# Patient Record
Sex: MALE | Race: AMERICAN INDIAN OR ALASKA NATIVE | NOT HISPANIC OR LATINO | URBAN - METROPOLITAN AREA
[De-identification: names, ages, dates, MRNs, and addresses within clinical notes are randomized per-mention and may not be internally consistent; named-entity substitution may affect disease eponyms.]

---

## 2017-07-17 ENCOUNTER — OUTPATIENT (OUTPATIENT)
Dept: OUTPATIENT SERVICES | Facility: HOSPITAL | Age: 62
LOS: 1 days | End: 2017-07-17
Payer: COMMERCIAL

## 2017-07-17 ENCOUNTER — APPOINTMENT (OUTPATIENT)
Dept: MRI IMAGING | Facility: IMAGING CENTER | Age: 62
End: 2017-07-17
Payer: COMMERCIAL

## 2017-07-17 DIAGNOSIS — Z98.89 OTHER SPECIFIED POSTPROCEDURAL STATES: Chronic | ICD-10-CM

## 2017-07-17 DIAGNOSIS — Z00.8 ENCOUNTER FOR OTHER GENERAL EXAMINATION: ICD-10-CM

## 2017-07-17 PROCEDURE — 74183 MRI ABD W/O CNTR FLWD CNTR: CPT | Mod: 26

## 2017-07-17 PROCEDURE — A9585: CPT

## 2017-07-17 PROCEDURE — 74183 MRI ABD W/O CNTR FLWD CNTR: CPT

## 2017-07-17 PROCEDURE — 82565 ASSAY OF CREATININE: CPT

## 2018-03-08 ENCOUNTER — APPOINTMENT (OUTPATIENT)
Dept: UROLOGY | Facility: CLINIC | Age: 63
End: 2018-03-08
Payer: COMMERCIAL

## 2018-03-08 LAB
APPEARANCE: CLEAR
BACTERIA: NEGATIVE
BILIRUBIN URINE: NEGATIVE
BLOOD URINE: NEGATIVE
COLOR: YELLOW
GLUCOSE QUALITATIVE U: NEGATIVE MG/DL
HYALINE CASTS: 1 /LPF
KETONES URINE: NEGATIVE
LEUKOCYTE ESTERASE URINE: NEGATIVE
MICROSCOPIC-UA: NORMAL
NITRITE URINE: NEGATIVE
PH URINE: 5.5
PROTEIN URINE: NEGATIVE MG/DL
RED BLOOD CELLS URINE: 1 /HPF
SPECIFIC GRAVITY URINE: 1.02
SQUAMOUS EPITHELIAL CELLS: 0 /HPF
UROBILINOGEN URINE: NEGATIVE MG/DL
WHITE BLOOD CELLS URINE: 1 /HPF

## 2018-03-08 PROCEDURE — 99204 OFFICE O/P NEW MOD 45 MIN: CPT

## 2018-03-09 LAB
PSA FREE FLD-MCNC: NORMAL
PSA FREE SERPL-MCNC: <0.01 NG/ML
PSA SERPL-MCNC: 0.02 NG/ML

## 2018-03-10 LAB — CORE LAB FLUID CYTOLOGY: NORMAL

## 2018-04-03 ENCOUNTER — RX RENEWAL (OUTPATIENT)
Age: 63
End: 2018-04-03

## 2018-05-03 ENCOUNTER — APPOINTMENT (OUTPATIENT)
Dept: DERMATOLOGY | Facility: CLINIC | Age: 63
End: 2018-05-03
Payer: COMMERCIAL

## 2018-05-03 VITALS
DIASTOLIC BLOOD PRESSURE: 70 MMHG | WEIGHT: 152 LBS | HEIGHT: 63 IN | SYSTOLIC BLOOD PRESSURE: 120 MMHG | BODY MASS INDEX: 26.93 KG/M2

## 2018-05-03 DIAGNOSIS — Z91.89 OTHER SPECIFIED PERSONAL RISK FACTORS, NOT ELSEWHERE CLASSIFIED: ICD-10-CM

## 2018-05-03 PROCEDURE — 99203 OFFICE O/P NEW LOW 30 MIN: CPT

## 2018-06-27 ENCOUNTER — OTHER (OUTPATIENT)
Age: 63
End: 2018-06-27

## 2018-07-02 ENCOUNTER — OTHER (OUTPATIENT)
Age: 63
End: 2018-07-02

## 2018-07-09 ENCOUNTER — APPOINTMENT (OUTPATIENT)
Dept: MRI IMAGING | Facility: IMAGING CENTER | Age: 63
End: 2018-07-09
Payer: COMMERCIAL

## 2018-07-09 ENCOUNTER — OUTPATIENT (OUTPATIENT)
Dept: OUTPATIENT SERVICES | Facility: HOSPITAL | Age: 63
LOS: 1 days | End: 2018-07-09
Payer: COMMERCIAL

## 2018-07-09 DIAGNOSIS — D30.00 BENIGN NEOPLASM OF UNSPECIFIED KIDNEY: ICD-10-CM

## 2018-07-09 DIAGNOSIS — Z98.89 OTHER SPECIFIED POSTPROCEDURAL STATES: Chronic | ICD-10-CM

## 2018-07-09 PROCEDURE — 82565 ASSAY OF CREATININE: CPT

## 2018-07-09 PROCEDURE — 74183 MRI ABD W/O CNTR FLWD CNTR: CPT

## 2018-07-09 PROCEDURE — 74183 MRI ABD W/O CNTR FLWD CNTR: CPT | Mod: 26

## 2018-07-09 PROCEDURE — A9585: CPT

## 2018-09-21 ENCOUNTER — RX RENEWAL (OUTPATIENT)
Age: 63
End: 2018-09-21

## 2019-01-30 ENCOUNTER — APPOINTMENT (OUTPATIENT)
Dept: UROLOGY | Facility: CLINIC | Age: 64
End: 2019-01-30
Payer: COMMERCIAL

## 2019-01-30 LAB
APPEARANCE: CLEAR
BACTERIA: NEGATIVE
BILIRUBIN URINE: NEGATIVE
BLOOD URINE: NEGATIVE
COLOR: YELLOW
GLUCOSE QUALITATIVE U: NEGATIVE MG/DL
KETONES URINE: NEGATIVE
LEUKOCYTE ESTERASE URINE: NEGATIVE
MICROSCOPIC-UA: NORMAL
NITRITE URINE: NEGATIVE
PH URINE: 6
PROTEIN URINE: NEGATIVE MG/DL
RED BLOOD CELLS URINE: 0 /HPF
SPECIFIC GRAVITY URINE: 1.01
SQUAMOUS EPITHELIAL CELLS: 0 /HPF
UROBILINOGEN URINE: NEGATIVE MG/DL
WHITE BLOOD CELLS URINE: 0 /HPF

## 2019-01-30 PROCEDURE — 99214 OFFICE O/P EST MOD 30 MIN: CPT

## 2019-01-31 LAB
PSA FREE FLD-MCNC: NORMAL
PSA FREE SERPL-MCNC: <0.01 NG/ML
PSA SERPL-MCNC: 0.01 NG/ML

## 2019-07-05 ENCOUNTER — RX RENEWAL (OUTPATIENT)
Age: 64
End: 2019-07-05

## 2019-07-15 ENCOUNTER — OTHER (OUTPATIENT)
Age: 64
End: 2019-07-15

## 2019-07-17 ENCOUNTER — OTHER (OUTPATIENT)
Age: 64
End: 2019-07-17

## 2019-07-22 ENCOUNTER — FORM ENCOUNTER (OUTPATIENT)
Age: 64
End: 2019-07-22

## 2019-07-23 ENCOUNTER — APPOINTMENT (OUTPATIENT)
Dept: MRI IMAGING | Facility: IMAGING CENTER | Age: 64
End: 2019-07-23
Payer: COMMERCIAL

## 2019-07-23 ENCOUNTER — OUTPATIENT (OUTPATIENT)
Dept: OUTPATIENT SERVICES | Facility: HOSPITAL | Age: 64
LOS: 1 days | End: 2019-07-23
Payer: COMMERCIAL

## 2019-07-23 DIAGNOSIS — Z98.89 OTHER SPECIFIED POSTPROCEDURAL STATES: Chronic | ICD-10-CM

## 2019-07-23 DIAGNOSIS — N40.1 BENIGN PROSTATIC HYPERPLASIA WITH LOWER URINARY TRACT SYMPTOMS: ICD-10-CM

## 2019-07-23 PROCEDURE — 74183 MRI ABD W/O CNTR FLWD CNTR: CPT

## 2019-07-23 PROCEDURE — A9585: CPT

## 2019-07-23 PROCEDURE — 74183 MRI ABD W/O CNTR FLWD CNTR: CPT | Mod: 26

## 2019-11-25 ENCOUNTER — EMERGENCY (EMERGENCY)
Facility: HOSPITAL | Age: 64
LOS: 1 days | Discharge: HOME | End: 2019-11-25
Attending: EMERGENCY MEDICINE
Payer: COMMERCIAL

## 2019-11-25 VITALS
HEART RATE: 72 BPM | RESPIRATION RATE: 16 BRPM | OXYGEN SATURATION: 97 % | SYSTOLIC BLOOD PRESSURE: 136 MMHG | DIASTOLIC BLOOD PRESSURE: 96 MMHG | TEMPERATURE: 97 F

## 2019-11-25 VITALS
HEIGHT: 63 IN | RESPIRATION RATE: 16 BRPM | WEIGHT: 149.03 LBS | SYSTOLIC BLOOD PRESSURE: 155 MMHG | TEMPERATURE: 98 F | OXYGEN SATURATION: 97 % | DIASTOLIC BLOOD PRESSURE: 86 MMHG | HEART RATE: 79 BPM

## 2019-11-25 DIAGNOSIS — Z98.89 OTHER SPECIFIED POSTPROCEDURAL STATES: Chronic | ICD-10-CM

## 2019-11-25 LAB
APPEARANCE UR: CLEAR — SIGNIFICANT CHANGE UP
BILIRUB UR-MCNC: NEGATIVE — SIGNIFICANT CHANGE UP
COLOR SPEC: YELLOW — SIGNIFICANT CHANGE UP
DIFF PNL FLD: NEGATIVE — SIGNIFICANT CHANGE UP
GLUCOSE UR QL: NEGATIVE — SIGNIFICANT CHANGE UP
KETONES UR-MCNC: NEGATIVE — SIGNIFICANT CHANGE UP
LEUKOCYTE ESTERASE UR-ACNC: NEGATIVE — SIGNIFICANT CHANGE UP
NITRITE UR-MCNC: NEGATIVE — SIGNIFICANT CHANGE UP
PH UR: 6 — SIGNIFICANT CHANGE UP (ref 5–8)
PROT UR-MCNC: ABNORMAL
SP GR SPEC: 1.03 — HIGH (ref 1.01–1.02)
UROBILINOGEN FLD QL: NEGATIVE — SIGNIFICANT CHANGE UP

## 2019-11-25 PROCEDURE — 99283 EMERGENCY DEPT VISIT LOW MDM: CPT

## 2019-11-25 PROCEDURE — 87086 URINE CULTURE/COLONY COUNT: CPT

## 2019-11-25 PROCEDURE — 81001 URINALYSIS AUTO W/SCOPE: CPT

## 2019-11-25 RX ORDER — IBUPROFEN 200 MG
600 TABLET ORAL ONCE
Refills: 0 | Status: COMPLETED | OUTPATIENT
Start: 2019-11-25 | End: 2019-11-25

## 2019-11-25 RX ORDER — LIDOCAINE 4 G/100G
1 CREAM TOPICAL ONCE
Refills: 0 | Status: COMPLETED | OUTPATIENT
Start: 2019-11-25 | End: 2019-11-25

## 2019-11-25 RX ORDER — ACETAMINOPHEN 500 MG
975 TABLET ORAL ONCE
Refills: 0 | Status: COMPLETED | OUTPATIENT
Start: 2019-11-25 | End: 2019-11-25

## 2019-11-25 RX ADMIN — Medication 975 MILLIGRAM(S): at 10:14

## 2019-11-25 RX ADMIN — Medication 600 MILLIGRAM(S): at 10:14

## 2019-11-25 RX ADMIN — Medication 975 MILLIGRAM(S): at 09:10

## 2019-11-25 RX ADMIN — LIDOCAINE 1 PATCH: 4 CREAM TOPICAL at 09:11

## 2019-11-25 RX ADMIN — Medication 600 MILLIGRAM(S): at 09:10

## 2019-11-25 RX ADMIN — LIDOCAINE 1 PATCH: 4 CREAM TOPICAL at 09:31

## 2019-11-25 NOTE — ED PROVIDER NOTE - NSFOLLOWUPINSTRUCTIONS_ED_ALL_ED_FT
(1) Follow up with your primary care physician as discussed. In addition, we did not find evidence of a life threatening illness on your testing here today, but listed below are the specialists that will be necessary to see as an outpatient to continue the workup.  Please call the numbers listed below or [phone] to set up the necessary appointments.  (2) Immediately seek care at your nearest emergency room if your worsen, persist, or do not resolve   (3) Take Tylenol (up to 1000mg or 1 g)  and/or Motrin (up to 600mg) up to every 6 hours as needed for pain.

## 2019-11-25 NOTE — ED PROVIDER NOTE - OBJECTIVE STATEMENT
65yo M pw cc of back pain    3 years of back pain. Past 2-3 months worsening such that walking/functioning is becoming more difficult, particular painful when bending over or lifting heavy objects. When particularly painful can get Nauseas. No other joint involvement. Went to PCP (Phil Lan) 2 months ago and was told it was MSK pain, ran ekg/labs. No weakness/numbness of legs, No bowel/bladder issues. Has had low grade fevers associated (100.5F) for months. No tick bite hx. No trauma, no blood in urine or burning on urination. No hx kidney stones.   Nothing for pain today  No autoimmune issues in family  Socially drinking, does not smoke    pshx: Radiation seed implant of prostate "10years ago"     Meds: Statin  NDKA

## 2019-11-25 NOTE — ED PROVIDER NOTE - PROGRESS NOTE DETAILS
Bandar PGY-2:  UA -, pain improved. will d.c   I have given the pt strict return and follow up precautions. The patient has been provided with a copy of all pertinent results. The patient has been informed of all concerning signs and symptoms to return to Emergency Department, the necessity to follow up with PMD/Clinic/follow up provided within 2-3 days was explained, and the patient reports understanding of above with capacity and insight.

## 2019-11-25 NOTE — ED PROVIDER NOTE - NSFOLLOWUPCLINICS_GEN_ALL_ED_FT
[facility]  Rheumatology  [address] [address]  Phone: [phone]  Fax:   Follow Up Time: Routine

## 2019-11-25 NOTE — ED ADULT NURSE NOTE - OBJECTIVE STATEMENT
63 y/o M, PMH HLD, PSH prostate surgery with seeds implanted in 2012 for prostate CA, presenting to ED with wife for intermittent b/l lower flank pain x 3 years, worsened x 1 month. Pt reports morning stiffness in lower, b/l back, also with intermittent low grade morning fevers, malaise and nausea. Pt reports he wakes up with these symptoms a few times/week x past 1 month, has been taking his temp a few mornings/week, reports it is usually between 99.5-100.5F, he takes Motrin for the back pain and fevers. Pt denies headache, dizziness, chest pain, palpitations, cough, SOB, abdominal pain, n/v/d, urinary symptoms, urinary/bowel incontinence, weight loss, fevers, chills, weakness at this time. C/o b/l lower flank pain and malaise. No CVA tenderness.

## 2019-11-25 NOTE — ED PROVIDER NOTE - PATIENT PORTAL LINK FT
You can access the FollowMyHealth Patient Portal offered by [facility] by registering at the following website: [email or web address]. By joining [employer]’s FollowMyHealth portal, you will also be able to view your health information using other applications (apps) compatible with our system.

## 2019-11-25 NOTE — ED PROVIDER NOTE - CLINICAL SUMMARY MEDICAL DECISION MAKING FREE TEXT BOX
65yo M pw cc of back pain. Has been years, worse with movement/bending over likely MSK will give meds for msk pain and reasses, no bowel/bladder or other neuro sx do not think there is a serious spinal cord etiology as cause of chronic back pain. States low grade fever for months, no other joint involvement doubt autoimmune etiology however will refer to rheumatology upon d/c for further workup, doubt acute etiology such as abscess as no hx of IVDU and pain has been months. In context of complicated renal cyst will also get UA to assess if contributing to back pain.

## 2019-11-25 NOTE — ED PROVIDER NOTE - PHYSICAL EXAMINATION
General: well appearing, interactive, well nourished, NAD  HEENT: pupils equal and reactive, normal oropharynx, normal external ears bilaterally   Cardiac: RRR, no MRG appreciated  Resp: lungs clear to auscultation bilaterally, symmetric chest wall rise  Abd: soft, nontender, nondistended, normoactive bowel sounds  : no CVA tenderness  Back: TTP diffusely over lower back  Neuro: Moving all extremities  Skin:  normal color for race

## 2019-11-25 NOTE — ED PROVIDER NOTE - ATTENDING CONTRIBUTION TO CARE
64yr M hx of chronic back pain, with repeat MRIs which showed no acute interveneable disease p/w back pain, not midline, bilateral flanks, no hx of kidney stone. no fever chills, no juandice, no vomiting, no fever chills. no change in bowel habits or urinary retention or perineal paresthesia. no hx of trauma.  pt appears very concerned about pancreatic cancer and his renal cysts. we had a discussion about warning signs of pancreatic cancer and strategy for followup of renal cysts.  exam is reassuring w no midline ttp, neuro vasc intact.  will check urine and symptomatic treatment.     urine neg, will follow up with primary as appropriate.

## 2019-11-25 NOTE — ED ADULT NURSE NOTE - NSIMPLEMENTINTERV_GEN_ALL_ED
Implemented All Fall Risk Interventions:  [place] to call system. Call bell, personal items and telephone within reach. Instruct patient to call for assistance. Room bathroom lighting operational. Non-slip footwear when patient is off stretcher. Physically safe environment: no spills, clutter or unnecessary equipment. Stretcher in lowest position, wheels locked, appropriate side rails in place. Provide visual cue, wrist band, yellow gown, etc. Monitor gait and stability. Monitor for mental status changes and reorient to person, place, and time. Review medications for side effects contributing to fall risk. Reinforce activity limits and safety measures with patient and family.

## 2019-11-25 NOTE — ED PROVIDER NOTE - NS ED ROS FT
CONSTITUTIONAL:  no weight loss  Eyes: No vision changes  Cardiovascular: No Chest pain  Respiratory: No SOB  Gastrointestinal: No n/v/d, no abd pain  Genitourinary: no dysuria, no hematuria  SKIN: no rashes.  NEURO: no headache  PSYCHIATRIC: no known mental health issues.

## 2019-11-25 NOTE — ED ADULT NURSE NOTE - CHPI ED NUR SYMPTOMS NEG
no tingling/no constipation/no bowel dysfunction/no motor function loss/no difficulty bearing weight/no numbness/no bladder dysfunction/no anorexia/no neck tenderness

## 2019-11-26 LAB
CULTURE RESULTS: SIGNIFICANT CHANGE UP
SPECIMEN SOURCE: SIGNIFICANT CHANGE UP

## 2019-12-12 ENCOUNTER — APPOINTMENT (OUTPATIENT)
Dept: UROLOGY | Facility: CLINIC | Age: 64
End: 2019-12-12
Payer: COMMERCIAL

## 2019-12-12 PROCEDURE — 99214 OFFICE O/P EST MOD 30 MIN: CPT

## 2019-12-12 NOTE — PHYSICAL EXAM
[General Appearance - Well Nourished] : well nourished [Well Groomed] : well groomed [General Appearance - Well Developed] : well developed [Normal Appearance] : normal appearance [Abdomen Tenderness] : non-tender [General Appearance - In No Acute Distress] : no acute distress [Abdomen Soft] : soft [Urethral Meatus] : meatus normal [Costovertebral Angle Tenderness] : no ~M costovertebral angle tenderness [Urinary Bladder Findings] : the bladder was normal on palpation [Scrotum] : the scrotum was normal [Testes Mass (___cm)] : there were no testicular masses [No Prostate Nodules] : no prostate nodules [Edema] : no peripheral edema [] : no respiratory distress [Oriented To Time, Place, And Person] : oriented to person, place, and time [Respiration, Rhythm And Depth] : normal respiratory rhythm and effort [Exaggerated Use Of Accessory Muscles For Inspiration] : no accessory muscle use [Mood] : the mood was normal [Not Anxious] : not anxious [Affect] : the affect was normal [Normal Station and Gait] : the gait and station were normal for the patient's age [No Palpable Adenopathy] : no palpable adenopathy [No Focal Deficits] : no focal deficits

## 2019-12-13 LAB
APPEARANCE: CLEAR
BACTERIA: NEGATIVE
BILIRUBIN URINE: NEGATIVE
BLOOD URINE: NEGATIVE
COLOR: NORMAL
GLUCOSE QUALITATIVE U: NEGATIVE
HYALINE CASTS: 0 /LPF
KETONES URINE: NEGATIVE
LEUKOCYTE ESTERASE URINE: NEGATIVE
MICROSCOPIC-UA: NORMAL
NITRITE URINE: NEGATIVE
PH URINE: 6
PROTEIN URINE: NEGATIVE
RED BLOOD CELLS URINE: 0 /HPF
SPECIFIC GRAVITY URINE: 1.02
SQUAMOUS EPITHELIAL CELLS: 0 /HPF
UROBILINOGEN URINE: NORMAL
WHITE BLOOD CELLS URINE: 1 /HPF

## 2020-01-05 ENCOUNTER — RX RENEWAL (OUTPATIENT)
Age: 65
End: 2020-01-05

## 2020-01-06 ENCOUNTER — FORM ENCOUNTER (OUTPATIENT)
Age: 65
End: 2020-01-06

## 2020-01-07 ENCOUNTER — OUTPATIENT (OUTPATIENT)
Dept: OUTPATIENT SERVICES | Facility: HOSPITAL | Age: 65
LOS: 1 days | End: 2020-01-07
Payer: MEDICARE

## 2020-01-07 ENCOUNTER — APPOINTMENT (OUTPATIENT)
Dept: RHEUMATOLOGY | Facility: CLINIC | Age: 65
End: 2020-01-07
Payer: MEDICARE

## 2020-01-07 ENCOUNTER — APPOINTMENT (OUTPATIENT)
Dept: RADIOLOGY | Facility: IMAGING CENTER | Age: 65
End: 2020-01-07
Payer: MEDICARE

## 2020-01-07 VITALS
DIASTOLIC BLOOD PRESSURE: 90 MMHG | SYSTOLIC BLOOD PRESSURE: 144 MMHG | HEART RATE: 90 BPM | WEIGHT: 150 LBS | HEIGHT: 63 IN | TEMPERATURE: 98.4 F | BODY MASS INDEX: 26.58 KG/M2 | RESPIRATION RATE: 16 BRPM | OXYGEN SATURATION: 98 %

## 2020-01-07 DIAGNOSIS — M54.5 LOW BACK PAIN: ICD-10-CM

## 2020-01-07 DIAGNOSIS — Z98.89 OTHER SPECIFIED POSTPROCEDURAL STATES: Chronic | ICD-10-CM

## 2020-01-07 PROCEDURE — 72100 X-RAY EXAM L-S SPINE 2/3 VWS: CPT | Mod: 26

## 2020-01-07 PROCEDURE — 72100 X-RAY EXAM L-S SPINE 2/3 VWS: CPT

## 2020-01-07 PROCEDURE — 99204 OFFICE O/P NEW MOD 45 MIN: CPT

## 2020-01-08 LAB
ALBUMIN SERPL ELPH-MCNC: 4.5 G/DL
ALP BLD-CCNC: 91 U/L
ALT SERPL-CCNC: 28 U/L
ANION GAP SERPL CALC-SCNC: 12 MMOL/L
AST SERPL-CCNC: 16 U/L
BASOPHILS # BLD AUTO: 0.03 K/UL
BASOPHILS NFR BLD AUTO: 0.7 %
BILIRUB SERPL-MCNC: 0.3 MG/DL
BUN SERPL-MCNC: 15 MG/DL
CALCIUM SERPL-MCNC: 9.7 MG/DL
CHLORIDE SERPL-SCNC: 104 MMOL/L
CO2 SERPL-SCNC: 24 MMOL/L
CREAT SERPL-MCNC: 1.18 MG/DL
EOSINOPHIL # BLD AUTO: 0.31 K/UL
EOSINOPHIL NFR BLD AUTO: 6.9 %
GLUCOSE SERPL-MCNC: 120 MG/DL
HCT VFR BLD CALC: 41.6 %
HGB BLD-MCNC: 13.7 G/DL
IMM GRANULOCYTES NFR BLD AUTO: 0.2 %
LYMPHOCYTES # BLD AUTO: 1.05 K/UL
LYMPHOCYTES NFR BLD AUTO: 23.3 %
MAN DIFF?: NORMAL
MCHC RBC-ENTMCNC: 29.1 PG
MCHC RBC-ENTMCNC: 32.9 GM/DL
MCV RBC AUTO: 88.3 FL
MONOCYTES # BLD AUTO: 0.44 K/UL
MONOCYTES NFR BLD AUTO: 9.8 %
NEUTROPHILS # BLD AUTO: 2.67 K/UL
NEUTROPHILS NFR BLD AUTO: 59.1 %
PLATELET # BLD AUTO: 183 K/UL
POTASSIUM SERPL-SCNC: 4.3 MMOL/L
PROT SERPL-MCNC: 6.9 G/DL
RBC # BLD: 4.71 M/UL
RBC # FLD: 13.1 %
SODIUM SERPL-SCNC: 140 MMOL/L
WBC # FLD AUTO: 4.51 K/UL

## 2020-01-13 ENCOUNTER — FORM ENCOUNTER (OUTPATIENT)
Age: 65
End: 2020-01-13

## 2020-01-14 ENCOUNTER — APPOINTMENT (OUTPATIENT)
Dept: MRI IMAGING | Facility: CLINIC | Age: 65
End: 2020-01-14
Payer: MEDICARE

## 2020-01-14 ENCOUNTER — OUTPATIENT (OUTPATIENT)
Dept: OUTPATIENT SERVICES | Facility: HOSPITAL | Age: 65
LOS: 1 days | End: 2020-01-14
Payer: MEDICARE

## 2020-01-14 DIAGNOSIS — M54.5 LOW BACK PAIN: ICD-10-CM

## 2020-01-14 DIAGNOSIS — Z98.89 OTHER SPECIFIED POSTPROCEDURAL STATES: Chronic | ICD-10-CM

## 2020-01-14 PROCEDURE — 72158 MRI LUMBAR SPINE W/O & W/DYE: CPT

## 2020-01-14 PROCEDURE — 72158 MRI LUMBAR SPINE W/O & W/DYE: CPT | Mod: 26

## 2020-01-14 PROCEDURE — A9585: CPT

## 2020-01-27 ENCOUNTER — APPOINTMENT (OUTPATIENT)
Dept: FAMILY MEDICINE | Facility: CLINIC | Age: 65
End: 2020-01-27
Payer: MEDICARE

## 2020-01-27 VITALS
HEIGHT: 63 IN | SYSTOLIC BLOOD PRESSURE: 128 MMHG | HEART RATE: 76 BPM | DIASTOLIC BLOOD PRESSURE: 86 MMHG | OXYGEN SATURATION: 98 % | TEMPERATURE: 97.4 F

## 2020-01-27 DIAGNOSIS — L30.0 NUMMULAR DERMATITIS: ICD-10-CM

## 2020-01-27 PROCEDURE — 99203 OFFICE O/P NEW LOW 30 MIN: CPT

## 2020-01-27 RX ORDER — CELECOXIB 200 MG/1
200 CAPSULE ORAL
Qty: 30 | Refills: 0 | Status: DISCONTINUED | COMMUNITY
Start: 2020-01-08 | End: 2020-01-27

## 2020-01-28 ENCOUNTER — APPOINTMENT (OUTPATIENT)
Dept: OPHTHALMOLOGY | Facility: CLINIC | Age: 65
End: 2020-01-28
Payer: MEDICARE

## 2020-01-28 ENCOUNTER — NON-APPOINTMENT (OUTPATIENT)
Age: 65
End: 2020-01-28

## 2020-01-28 PROCEDURE — 92004 COMPRE OPH EXAM NEW PT 1/>: CPT

## 2020-01-30 ENCOUNTER — APPOINTMENT (OUTPATIENT)
Dept: ORTHOPEDIC SURGERY | Facility: CLINIC | Age: 65
End: 2020-01-30
Payer: MEDICARE

## 2020-01-31 ENCOUNTER — APPOINTMENT (OUTPATIENT)
Dept: ORTHOPEDIC SURGERY | Facility: CLINIC | Age: 65
End: 2020-01-31
Payer: MEDICARE

## 2020-01-31 VITALS
HEIGHT: 63 IN | WEIGHT: 149 LBS | HEART RATE: 73 BPM | BODY MASS INDEX: 26.4 KG/M2 | SYSTOLIC BLOOD PRESSURE: 121 MMHG | DIASTOLIC BLOOD PRESSURE: 78 MMHG

## 2020-01-31 PROCEDURE — 99204 OFFICE O/P NEW MOD 45 MIN: CPT

## 2020-07-02 ENCOUNTER — APPOINTMENT (OUTPATIENT)
Dept: UROLOGY | Facility: CLINIC | Age: 65
End: 2020-07-02
Payer: MEDICARE

## 2020-07-02 ENCOUNTER — RX RENEWAL (OUTPATIENT)
Age: 65
End: 2020-07-02

## 2020-07-02 VITALS — TEMPERATURE: 97.3 F

## 2020-07-02 PROCEDURE — 99213 OFFICE O/P EST LOW 20 MIN: CPT

## 2020-07-02 NOTE — PHYSICAL EXAM
[General Appearance - Well Nourished] : well nourished [General Appearance - Well Developed] : well developed [Well Groomed] : well groomed [Normal Appearance] : normal appearance [Abdomen Soft] : soft [General Appearance - In No Acute Distress] : no acute distress [Abdomen Tenderness] : non-tender [Costovertebral Angle Tenderness] : no ~M costovertebral angle tenderness [Urethral Meatus] : meatus normal [Urinary Bladder Findings] : the bladder was normal on palpation [Scrotum] : the scrotum was normal [Testes Mass (___cm)] : there were no testicular masses [No Prostate Nodules] : no prostate nodules [Edema] : no peripheral edema [] : no respiratory distress [Exaggerated Use Of Accessory Muscles For Inspiration] : no accessory muscle use [Respiration, Rhythm And Depth] : normal respiratory rhythm and effort [Affect] : the affect was normal [Oriented To Time, Place, And Person] : oriented to person, place, and time [Mood] : the mood was normal [Not Anxious] : not anxious [No Focal Deficits] : no focal deficits [Normal Station and Gait] : the gait and station were normal for the patient's age [No Palpable Adenopathy] : no palpable adenopathy

## 2020-07-03 LAB
APPEARANCE: CLEAR
BACTERIA: NEGATIVE
BILIRUBIN URINE: NEGATIVE
BLOOD URINE: NEGATIVE
COLOR: NORMAL
GLUCOSE QUALITATIVE U: NEGATIVE
HYALINE CASTS: 0 /LPF
KETONES URINE: NEGATIVE
LEUKOCYTE ESTERASE URINE: NEGATIVE
MICROSCOPIC-UA: NORMAL
NITRITE URINE: NEGATIVE
PH URINE: 6.5
PROTEIN URINE: NEGATIVE
PSA FREE FLD-MCNC: NORMAL %
PSA FREE SERPL-MCNC: <0.01 NG/ML
PSA SERPL-MCNC: <0.01 NG/ML
RED BLOOD CELLS URINE: 0 /HPF
SPECIFIC GRAVITY URINE: 1.01
SQUAMOUS EPITHELIAL CELLS: 0 /HPF
UROBILINOGEN URINE: NORMAL
WHITE BLOOD CELLS URINE: 0 /HPF

## 2020-07-17 ENCOUNTER — APPOINTMENT (OUTPATIENT)
Dept: MRI IMAGING | Facility: CLINIC | Age: 65
End: 2020-07-17
Payer: MEDICARE

## 2020-07-17 ENCOUNTER — RESULT REVIEW (OUTPATIENT)
Age: 65
End: 2020-07-17

## 2020-07-17 ENCOUNTER — OUTPATIENT (OUTPATIENT)
Dept: OUTPATIENT SERVICES | Facility: HOSPITAL | Age: 65
LOS: 1 days | End: 2020-07-17
Payer: MEDICARE

## 2020-07-17 DIAGNOSIS — D30.00 BENIGN NEOPLASM OF UNSPECIFIED KIDNEY: ICD-10-CM

## 2020-07-17 DIAGNOSIS — Z98.89 OTHER SPECIFIED POSTPROCEDURAL STATES: Chronic | ICD-10-CM

## 2020-07-17 PROCEDURE — 74183 MRI ABD W/O CNTR FLWD CNTR: CPT | Mod: 26

## 2020-07-17 PROCEDURE — A9585: CPT

## 2020-07-17 PROCEDURE — 74183 MRI ABD W/O CNTR FLWD CNTR: CPT

## 2020-07-28 ENCOUNTER — APPOINTMENT (OUTPATIENT)
Dept: OPHTHALMOLOGY | Facility: CLINIC | Age: 65
End: 2020-07-28

## 2020-07-31 ENCOUNTER — APPOINTMENT (OUTPATIENT)
Dept: ORTHOPEDIC SURGERY | Facility: CLINIC | Age: 65
End: 2020-07-31
Payer: MEDICARE

## 2020-07-31 VITALS
DIASTOLIC BLOOD PRESSURE: 78 MMHG | HEIGHT: 63 IN | HEART RATE: 66 BPM | BODY MASS INDEX: 25.69 KG/M2 | SYSTOLIC BLOOD PRESSURE: 125 MMHG | WEIGHT: 145 LBS

## 2020-07-31 VITALS — TEMPERATURE: 97.4 F

## 2020-07-31 PROCEDURE — 99214 OFFICE O/P EST MOD 30 MIN: CPT

## 2020-08-05 ENCOUNTER — APPOINTMENT (OUTPATIENT)
Dept: NEUROSURGERY | Facility: CLINIC | Age: 65
End: 2020-08-05
Payer: MEDICARE

## 2020-08-05 VITALS — TEMPERATURE: 97.6 F

## 2020-08-05 PROCEDURE — 99203 OFFICE O/P NEW LOW 30 MIN: CPT

## 2020-08-05 RX ORDER — TAMSULOSIN HYDROCHLORIDE 0.4 MG/1
0.4 CAPSULE ORAL
Qty: 90 | Refills: 1 | Status: DISCONTINUED | COMMUNITY
Start: 2018-04-03 | End: 2020-08-05

## 2020-08-05 NOTE — PHYSICAL EXAM
[General Appearance - Alert] : alert [Mood] : the mood was normal [Sensation Tactile Decrease] : light touch was intact [Motor Strength] : muscle strength was normal in all four extremities [2+] : Ankle jerk left 2+ [Straight-Leg Raise Test - Left] : straight leg raise of the left leg was negative [Straight-Leg Raise Test - Right] : straight leg raise  of the right leg was negative [Able to toe walk] : the patient was able to toe walk [Able to heel walk] : the patient was able to heel walk [No Spinal Tenderness] : no spinal tenderness [] : no rash

## 2020-08-05 NOTE — HISTORY OF PRESENT ILLNESS
[Back] : back [___ yrs] : [unfilled] year(s) ago [9] : a maximum pain level of 9/10 [Sharp] : sharp [Right] : right [Posterior] : posterior aspect of the [Sitting] : sitting [Standing] : standing [Weakness] : weakness [Insomnia] : insomnia [Gait Dysfunction] : gait dysfunction [PT] : PT [Chiropractor] : chiropractor [FreeTextEntry2] : both legs [Medications] : medications [FreeTextEntry6] : meloxicam

## 2020-08-05 NOTE — DATA REVIEWED
[de-identified] : MRI lumbar spine done 1/14/20 showed IMPRESSION: \par No definitive evidence of metastatic disease in the lumbar spine. Schmorl's nodes at the inferior \par endplate of L3 and superior endplate of L4 with surrounding nonspecific enhancement, which may \par be related to inflammatory changes. Relative preservation of normal fatty marrow signal \par surrounding the Schmorl's nodes. \par Multilevel spondylosis and facet arthropathy as described above. \par At L3-L4 and L4-L5, there is narrowing of bilateral lateral recesses with disc material in close \par proximity to the nerve roots in the lateral recesses.

## 2020-08-05 NOTE — CONSULT LETTER
[Consult Letter:] : I had the pleasure of evaluating your patient, [unfilled]. [Dear  ___] : Dear ~VÍCTOR, [Please see my note below.] : Please see my note below. [Sincerely,] : Sincerely, [Consult Closing:] : Thank you very much for allowing me to participate in the care of this patient.  If you have any questions, please do not hesitate to contact me. [FreeTextEntry2] : Farhat Najera MD\par [address]\[place] [address] [FreeTextEntry3] : Jacque

## 2020-08-05 NOTE — ASSESSMENT
[FreeTextEntry1] : 65 year old male with low back pain secondary to lumbar facet arthropathy.  Given the nature of the patient's complaints and lack of significant improvement following more conservative measures, we did discuss lumbar facet steroid injection.  Risks, benefits, and expectations of the procedure were reviewed.  The patient was provided with an educational pamphlet outlining the details of the procedure so that he/she may have the ability to review the information prior to proceeding.  The patient has agreed to proceed and will follow up with me for the procedure.\par

## 2020-08-10 ENCOUNTER — APPOINTMENT (OUTPATIENT)
Dept: DISASTER EMERGENCY | Facility: CLINIC | Age: 65
End: 2020-08-10

## 2020-08-10 ENCOUNTER — APPOINTMENT (OUTPATIENT)
Dept: FAMILY MEDICINE | Facility: CLINIC | Age: 65
End: 2020-08-10
Payer: MEDICARE

## 2020-08-10 VITALS
SYSTOLIC BLOOD PRESSURE: 105 MMHG | BODY MASS INDEX: 26.22 KG/M2 | WEIGHT: 148 LBS | HEART RATE: 63 BPM | OXYGEN SATURATION: 98 % | TEMPERATURE: 97.5 F | RESPIRATION RATE: 16 BRPM | DIASTOLIC BLOOD PRESSURE: 67 MMHG | HEIGHT: 63 IN

## 2020-08-10 PROCEDURE — G0402 INITIAL PREVENTIVE EXAM: CPT

## 2020-08-10 PROCEDURE — 36415 COLL VENOUS BLD VENIPUNCTURE: CPT

## 2020-08-11 LAB
25(OH)D3 SERPL-MCNC: 21.6 NG/ML
ALBUMIN SERPL ELPH-MCNC: 4.2 G/DL
ALP BLD-CCNC: 77 U/L
ALT SERPL-CCNC: 17 U/L
ANION GAP SERPL CALC-SCNC: 13 MMOL/L
APPEARANCE: CLEAR
AST SERPL-CCNC: 16 U/L
BACTERIA: NEGATIVE
BASOPHILS # BLD AUTO: 0.03 K/UL
BASOPHILS NFR BLD AUTO: 0.7 %
BILIRUB SERPL-MCNC: 0.2 MG/DL
BILIRUBIN URINE: NEGATIVE
BLOOD URINE: NEGATIVE
BUN SERPL-MCNC: 18 MG/DL
CALCIUM SERPL-MCNC: 9.6 MG/DL
CHLORIDE SERPL-SCNC: 107 MMOL/L
CHOLEST SERPL-MCNC: 202 MG/DL
CHOLEST/HDLC SERPL: 4.2 RATIO
CO2 SERPL-SCNC: 20 MMOL/L
COLOR: NORMAL
CREAT SERPL-MCNC: 1 MG/DL
EOSINOPHIL # BLD AUTO: 0.24 K/UL
EOSINOPHIL NFR BLD AUTO: 5.3 %
ESTIMATED AVERAGE GLUCOSE: 123 MG/DL
GLUCOSE QUALITATIVE U: NEGATIVE
GLUCOSE SERPL-MCNC: 113 MG/DL
HBA1C MFR BLD HPLC: 5.9 %
HCT VFR BLD CALC: 42.5 %
HDLC SERPL-MCNC: 48 MG/DL
HGB BLD-MCNC: 13.8 G/DL
HYALINE CASTS: 0 /LPF
IMM GRANULOCYTES NFR BLD AUTO: 0.4 %
KETONES URINE: NEGATIVE
LDLC SERPL CALC-MCNC: 125 MG/DL
LEUKOCYTE ESTERASE URINE: NEGATIVE
LYMPHOCYTES # BLD AUTO: 1.91 K/UL
LYMPHOCYTES NFR BLD AUTO: 42 %
MAN DIFF?: NORMAL
MCHC RBC-ENTMCNC: 28.7 PG
MCHC RBC-ENTMCNC: 32.5 GM/DL
MCV RBC AUTO: 88.4 FL
MICROSCOPIC-UA: NORMAL
MONOCYTES # BLD AUTO: 0.3 K/UL
MONOCYTES NFR BLD AUTO: 6.6 %
NEUTROPHILS # BLD AUTO: 2.05 K/UL
NEUTROPHILS NFR BLD AUTO: 45 %
NITRITE URINE: NEGATIVE
PH URINE: 5.5
PLATELET # BLD AUTO: 180 K/UL
POTASSIUM SERPL-SCNC: 4.4 MMOL/L
PROT SERPL-MCNC: 6.8 G/DL
PROTEIN URINE: NEGATIVE
PSA SERPL-MCNC: <0.01 NG/ML
RBC # BLD: 4.81 M/UL
RBC # FLD: 13.2 %
RED BLOOD CELLS URINE: 0 /HPF
SARS-COV-2 N GENE NPH QL NAA+PROBE: NOT DETECTED
SODIUM SERPL-SCNC: 141 MMOL/L
SPECIFIC GRAVITY URINE: 1.02
SQUAMOUS EPITHELIAL CELLS: 0 /HPF
TRIGL SERPL-MCNC: 147 MG/DL
TSH SERPL-ACNC: 2.01 UIU/ML
UROBILINOGEN URINE: NORMAL
WBC # FLD AUTO: 4.55 K/UL
WHITE BLOOD CELLS URINE: 1 /HPF

## 2020-08-12 ENCOUNTER — TRANSCRIPTION ENCOUNTER (OUTPATIENT)
Age: 65
End: 2020-08-12

## 2020-08-13 ENCOUNTER — APPOINTMENT (OUTPATIENT)
Dept: NEUROSURGERY | Facility: CLINIC | Age: 65
End: 2020-08-13
Payer: MEDICARE

## 2020-08-13 ENCOUNTER — OUTPATIENT (OUTPATIENT)
Dept: OUTPATIENT SERVICES | Facility: HOSPITAL | Age: 65
LOS: 1 days | End: 2020-08-13
Payer: MEDICARE

## 2020-08-13 DIAGNOSIS — M54.16 RADICULOPATHY, LUMBAR REGION: ICD-10-CM

## 2020-08-13 DIAGNOSIS — Z98.89 OTHER SPECIFIED POSTPROCEDURAL STATES: Chronic | ICD-10-CM

## 2020-08-13 PROCEDURE — 64483 NJX AA&/STRD TFRM EPI L/S 1: CPT

## 2020-08-13 PROCEDURE — 64484 NJX AA&/STRD TFRM EPI L/S EA: CPT

## 2020-08-13 PROCEDURE — 64493 INJ PARAVERT F JNT L/S 1 LEV: CPT

## 2020-08-13 PROCEDURE — 64494 INJ PARAVERT F JNT L/S 2 LEV: CPT

## 2020-08-14 DIAGNOSIS — M47.816 SPONDYLOSIS WITHOUT MYELOPATHY OR RADICULOPATHY, LUMBAR REGION: ICD-10-CM

## 2020-08-26 ENCOUNTER — APPOINTMENT (OUTPATIENT)
Dept: NEUROSURGERY | Facility: CLINIC | Age: 65
End: 2020-08-26
Payer: MEDICARE

## 2020-08-26 VITALS
HEART RATE: 74 BPM | HEIGHT: 63 IN | SYSTOLIC BLOOD PRESSURE: 125 MMHG | BODY MASS INDEX: 26.05 KG/M2 | WEIGHT: 147 LBS | DIASTOLIC BLOOD PRESSURE: 82 MMHG

## 2020-08-26 DIAGNOSIS — M51.36 OTHER INTERVERTEBRAL DISC DEGENERATION, LUMBAR REGION: ICD-10-CM

## 2020-08-26 DIAGNOSIS — M48.07 SPINAL STENOSIS, LUMBOSACRAL REGION: ICD-10-CM

## 2020-08-26 PROCEDURE — 99213 OFFICE O/P EST LOW 20 MIN: CPT

## 2020-08-26 NOTE — PHYSICAL EXAM
[General Appearance - Alert] : alert [Mood] : the mood was normal [FreeTextEntry1] : no redness, swelling, or tenderness at the injection sites

## 2020-08-26 NOTE — REASON FOR VISIT
[FreeTextEntry1] : Patient returns after his first right-sided facet injection.  Unfortunately, he does not notice any noticeable improvement in his pain.  He states he still feel stiffness in the morning when he first wakes up but it eases up a bit during the day.  He still has difficulty with bending.   [Follow-Up: _____] : a [unfilled] follow-up visit

## 2020-10-01 ENCOUNTER — RX RENEWAL (OUTPATIENT)
Age: 65
End: 2020-10-01

## 2020-10-04 ENCOUNTER — EMERGENCY (EMERGENCY)
Facility: HOSPITAL | Age: 65
LOS: 0 days | Discharge: HOME | End: 2020-10-04
Payer: MEDICARE

## 2020-10-04 VITALS
WEIGHT: 164.91 LBS | TEMPERATURE: 98 F | SYSTOLIC BLOOD PRESSURE: 135 MMHG | RESPIRATION RATE: 20 BRPM | DIASTOLIC BLOOD PRESSURE: 85 MMHG | HEIGHT: 63 IN | OXYGEN SATURATION: 97 % | HEART RATE: 80 BPM

## 2020-10-04 DIAGNOSIS — M54.5 LOW BACK PAIN: ICD-10-CM

## 2020-10-04 DIAGNOSIS — M54.16 RADICULOPATHY, LUMBAR REGION: ICD-10-CM

## 2020-10-04 DIAGNOSIS — Z98.89 OTHER SPECIFIED POSTPROCEDURAL STATES: Chronic | ICD-10-CM

## 2020-10-04 PROCEDURE — 72100 X-RAY EXAM L-S SPINE 2/3 VWS: CPT | Mod: 26

## 2020-10-04 PROCEDURE — 99284 EMERGENCY DEPT VISIT MOD MDM: CPT

## 2020-10-04 RX ORDER — DIAZEPAM 5 MG
2 TABLET ORAL ONCE
Refills: 0 | Status: DISCONTINUED | OUTPATIENT
Start: 2020-10-04 | End: 2020-10-04

## 2020-10-04 RX ORDER — CYCLOBENZAPRINE HYDROCHLORIDE 10 MG/1
1 TABLET, FILM COATED ORAL
Qty: 9 | Refills: 0
Start: 2020-10-04 | End: 2020-10-06

## 2020-10-04 RX ORDER — IBUPROFEN 200 MG
600 TABLET ORAL ONCE
Refills: 0 | Status: COMPLETED | OUTPATIENT
Start: 2020-10-04 | End: 2020-10-04

## 2020-10-04 RX ORDER — OXYCODONE AND ACETAMINOPHEN 5; 325 MG/1; MG/1
1 TABLET ORAL
Qty: 9 | Refills: 0
Start: 2020-10-04 | End: 2020-10-06

## 2020-10-04 RX ORDER — OXYCODONE AND ACETAMINOPHEN 5; 325 MG/1; MG/1
1 TABLET ORAL ONCE
Refills: 0 | Status: DISCONTINUED | OUTPATIENT
Start: 2020-10-04 | End: 2020-10-04

## 2020-10-04 RX ADMIN — Medication 600 MILLIGRAM(S): at 17:21

## 2020-10-04 RX ADMIN — OXYCODONE AND ACETAMINOPHEN 1 TABLET(S): 5; 325 TABLET ORAL at 17:21

## 2020-10-04 RX ADMIN — Medication 600 MILLIGRAM(S): at 19:00

## 2020-10-04 RX ADMIN — OXYCODONE AND ACETAMINOPHEN 1 TABLET(S): 5; 325 TABLET ORAL at 19:00

## 2020-10-04 RX ADMIN — Medication 2 MILLIGRAM(S): at 19:00

## 2020-10-04 NOTE — ED PROVIDER NOTE - CLINICAL SUMMARY MEDICAL DECISION MAKING FREE TEXT BOX
Patient here with back pain and radicular pain, no numbness or weakness subjectively or on exam. Given acute worsening of pain will check plain film to evaluate for pathologic fracture, recommend analgesia, f/u with his spine specialist

## 2020-10-04 NOTE — ED PROVIDER NOTE - OBJECTIVE STATEMENT
65M with a history of chronic back pain presents with worsening low back pain since this morning. he reports [place] over in the [place], developed worsening pain radiating into bilateral anterior thighs, worse on the right side. He has been ambulatory. Denies numbness, weakness, incontinence. No fever, chills, nausea, vomiting. Denies CP/SOB. No abdominal pain. He has had Physical Therapy, Epidural injections, seen by pain management and spine surgery in the past. Did not try any medication at home

## 2020-10-04 NOTE — ED ADULT TRIAGE NOTE - CHIEF COMPLAINT QUOTE
Pt c/o lower back pain, radiating to the right lower leg, pain is worse on the right side of buttock, hx chronica back pain

## 2020-10-04 NOTE — ED PROVIDER NOTE - MUSCULOSKELETAL, MLM
Spine appears normal- no midline tenderness, range of motion is not limited, no muscle or joint tenderness

## 2020-10-12 ENCOUNTER — APPOINTMENT (OUTPATIENT)
Dept: DISASTER EMERGENCY | Facility: CLINIC | Age: 65
End: 2020-10-12

## 2020-10-15 ENCOUNTER — APPOINTMENT (OUTPATIENT)
Dept: NEUROSURGERY | Facility: CLINIC | Age: 65
End: 2020-10-15

## 2020-10-16 ENCOUNTER — APPOINTMENT (OUTPATIENT)
Dept: ULTRASOUND IMAGING | Facility: CLINIC | Age: 65
End: 2020-10-16
Payer: MEDICARE

## 2020-10-16 ENCOUNTER — OUTPATIENT (OUTPATIENT)
Dept: OUTPATIENT SERVICES | Facility: HOSPITAL | Age: 65
LOS: 1 days | End: 2020-10-16
Payer: MEDICARE

## 2020-10-16 DIAGNOSIS — N40.0 BENIGN PROSTATIC HYPERPLASIA WITHOUT LOWER URINARY TRACT SYMPTOMS: ICD-10-CM

## 2020-10-16 DIAGNOSIS — Z98.89 OTHER SPECIFIED POSTPROCEDURAL STATES: Chronic | ICD-10-CM

## 2020-10-16 PROCEDURE — 76770 US EXAM ABDO BACK WALL COMP: CPT

## 2020-10-17 ENCOUNTER — RESULT REVIEW (OUTPATIENT)
Age: 65
End: 2020-10-17

## 2020-10-17 PROCEDURE — 76770 US EXAM ABDO BACK WALL COMP: CPT | Mod: 26

## 2020-10-26 ENCOUNTER — APPOINTMENT (OUTPATIENT)
Dept: DISASTER EMERGENCY | Facility: CLINIC | Age: 65
End: 2020-10-26

## 2020-10-27 LAB — SARS-COV-2 N GENE NPH QL NAA+PROBE: NOT DETECTED

## 2020-10-28 ENCOUNTER — TRANSCRIPTION ENCOUNTER (OUTPATIENT)
Age: 65
End: 2020-10-28

## 2020-10-29 ENCOUNTER — APPOINTMENT (OUTPATIENT)
Dept: NEUROSURGERY | Facility: CLINIC | Age: 65
End: 2020-10-29
Payer: MEDICARE

## 2020-10-29 ENCOUNTER — OUTPATIENT (OUTPATIENT)
Dept: OUTPATIENT SERVICES | Facility: HOSPITAL | Age: 65
LOS: 1 days | End: 2020-10-29
Payer: MEDICARE

## 2020-10-29 DIAGNOSIS — Z98.89 OTHER SPECIFIED POSTPROCEDURAL STATES: Chronic | ICD-10-CM

## 2020-10-29 DIAGNOSIS — M54.16 RADICULOPATHY, LUMBAR REGION: ICD-10-CM

## 2020-10-29 PROCEDURE — 64493 INJ PARAVERT F JNT L/S 1 LEV: CPT

## 2020-10-29 PROCEDURE — 64494 INJ PARAVERT F JNT L/S 2 LEV: CPT

## 2020-11-04 DIAGNOSIS — M47.816 SPONDYLOSIS WITHOUT MYELOPATHY OR RADICULOPATHY, LUMBAR REGION: ICD-10-CM

## 2020-11-20 ENCOUNTER — APPOINTMENT (OUTPATIENT)
Dept: NEUROSURGERY | Facility: CLINIC | Age: 65
End: 2020-11-20

## 2021-01-07 ENCOUNTER — APPOINTMENT (OUTPATIENT)
Dept: FAMILY MEDICINE | Facility: CLINIC | Age: 66
End: 2021-01-07
Payer: MEDICARE

## 2021-01-07 VITALS
HEIGHT: 63 IN | DIASTOLIC BLOOD PRESSURE: 80 MMHG | OXYGEN SATURATION: 97 % | HEART RATE: 73 BPM | SYSTOLIC BLOOD PRESSURE: 130 MMHG | BODY MASS INDEX: 27.29 KG/M2 | WEIGHT: 154 LBS

## 2021-01-07 DIAGNOSIS — Z01.818 ENCOUNTER FOR OTHER PREPROCEDURAL EXAMINATION: ICD-10-CM

## 2021-01-07 PROCEDURE — 99072 ADDL SUPL MATRL&STAF TM PHE: CPT

## 2021-01-07 PROCEDURE — 99213 OFFICE O/P EST LOW 20 MIN: CPT

## 2021-01-07 NOTE — HISTORY OF PRESENT ILLNESS
[FreeTextEntry8] : c/o blood in urine on and off for the past couple wks, always c/o back pain, hx of kidney cysts, had some burning this am\par asking if mvi would cause this\par denies hx of kidney stones\par has seen uro in the past

## 2021-01-07 NOTE — PHYSICAL EXAM
[No Acute Distress] : no acute distress [Well Nourished] : well nourished [Normal Sclera/Conjunctiva] : normal sclera/conjunctiva [EOMI] : extraocular movements intact [Normal Outer Ear/Nose] : the outer ears and nose were normal in appearance [No JVD] : no jugular venous distention [No Respiratory Distress] : no respiratory distress  [No Accessory Muscle Use] : no accessory muscle use [Coordination Grossly Intact] : coordination grossly intact [Normal Affect] : the affect was normal [Alert and Oriented x3] : oriented to person, place, and time [Normal Insight/Judgement] : insight and judgment were intact [Non Tender] : non-tender [Non-distended] : non-distended [No CVA Tenderness] : no CVA  tenderness [No Spinal Tenderness] : no spinal tenderness [Kyphosis] : no kyphosis

## 2021-01-09 LAB — BACTERIA UR CULT: NORMAL

## 2021-01-12 ENCOUNTER — APPOINTMENT (OUTPATIENT)
Dept: ULTRASOUND IMAGING | Facility: CLINIC | Age: 66
End: 2021-01-12
Payer: MEDICARE

## 2021-01-12 ENCOUNTER — OUTPATIENT (OUTPATIENT)
Dept: OUTPATIENT SERVICES | Facility: HOSPITAL | Age: 66
LOS: 1 days | End: 2021-01-12
Payer: MEDICARE

## 2021-01-12 DIAGNOSIS — Z98.89 OTHER SPECIFIED POSTPROCEDURAL STATES: Chronic | ICD-10-CM

## 2021-01-12 DIAGNOSIS — Z00.8 ENCOUNTER FOR OTHER GENERAL EXAMINATION: ICD-10-CM

## 2021-01-12 DIAGNOSIS — R31.9 HEMATURIA, UNSPECIFIED: ICD-10-CM

## 2021-01-12 PROCEDURE — 76700 US EXAM ABDOM COMPLETE: CPT | Mod: 26

## 2021-01-12 PROCEDURE — 76700 US EXAM ABDOM COMPLETE: CPT

## 2021-01-21 ENCOUNTER — OUTPATIENT (OUTPATIENT)
Dept: OUTPATIENT SERVICES | Facility: HOSPITAL | Age: 66
LOS: 1 days | End: 2021-01-21
Payer: MEDICARE

## 2021-01-21 ENCOUNTER — APPOINTMENT (OUTPATIENT)
Dept: CT IMAGING | Facility: IMAGING CENTER | Age: 66
End: 2021-01-21
Payer: MEDICARE

## 2021-01-21 DIAGNOSIS — Z98.89 OTHER SPECIFIED POSTPROCEDURAL STATES: Chronic | ICD-10-CM

## 2021-01-21 DIAGNOSIS — N40.1 BENIGN PROSTATIC HYPERPLASIA WITH LOWER URINARY TRACT SYMPTOMS: ICD-10-CM

## 2021-01-21 PROCEDURE — 74178 CT ABD&PLV WO CNTR FLWD CNTR: CPT | Mod: 26

## 2021-01-21 PROCEDURE — 74178 CT ABD&PLV WO CNTR FLWD CNTR: CPT

## 2021-01-26 ENCOUNTER — APPOINTMENT (OUTPATIENT)
Dept: OPHTHALMOLOGY | Facility: CLINIC | Age: 66
End: 2021-01-26
Payer: MEDICARE

## 2021-01-26 ENCOUNTER — NON-APPOINTMENT (OUTPATIENT)
Age: 66
End: 2021-01-26

## 2021-01-26 PROCEDURE — 99072 ADDL SUPL MATRL&STAF TM PHE: CPT

## 2021-01-26 PROCEDURE — 92014 COMPRE OPH EXAM EST PT 1/>: CPT

## 2021-02-03 ENCOUNTER — RX RENEWAL (OUTPATIENT)
Age: 66
End: 2021-02-03

## 2021-02-17 ENCOUNTER — APPOINTMENT (OUTPATIENT)
Dept: OPHTHALMOLOGY | Facility: CLINIC | Age: 66
End: 2021-02-17
Payer: MEDICARE

## 2021-02-17 ENCOUNTER — NON-APPOINTMENT (OUTPATIENT)
Age: 66
End: 2021-02-17

## 2021-02-17 PROCEDURE — 92014 COMPRE OPH EXAM EST PT 1/>: CPT

## 2021-02-17 PROCEDURE — 99072 ADDL SUPL MATRL&STAF TM PHE: CPT

## 2021-03-30 ENCOUNTER — APPOINTMENT (OUTPATIENT)
Dept: FAMILY MEDICINE | Facility: CLINIC | Age: 66
End: 2021-03-30
Payer: MEDICARE

## 2021-03-30 VITALS
OXYGEN SATURATION: 97 % | SYSTOLIC BLOOD PRESSURE: 136 MMHG | BODY MASS INDEX: 27.29 KG/M2 | WEIGHT: 154 LBS | TEMPERATURE: 97.7 F | HEIGHT: 63 IN | DIASTOLIC BLOOD PRESSURE: 80 MMHG | HEART RATE: 69 BPM

## 2021-03-30 DIAGNOSIS — L30.9 DERMATITIS, UNSPECIFIED: ICD-10-CM

## 2021-03-30 PROCEDURE — 36415 COLL VENOUS BLD VENIPUNCTURE: CPT

## 2021-03-30 PROCEDURE — 99213 OFFICE O/P EST LOW 20 MIN: CPT | Mod: 25

## 2021-03-30 PROCEDURE — 99072 ADDL SUPL MATRL&STAF TM PHE: CPT

## 2021-03-30 NOTE — REVIEW OF SYSTEMS
[Itching] : itching [Skin Rash] : skin rash [Negative] : Respiratory [Mole Changes] : no mole changes [Nail Changes] : no nail changes [Hair Changes] : no hair changes

## 2021-03-31 LAB
25(OH)D3 SERPL-MCNC: 16.4 NG/ML
ALBUMIN SERPL ELPH-MCNC: 4.6 G/DL
ALP BLD-CCNC: 94 U/L
ALT SERPL-CCNC: 20 U/L
ANION GAP SERPL CALC-SCNC: 11 MMOL/L
AST SERPL-CCNC: 18 U/L
BASOPHILS # BLD AUTO: 0.03 K/UL
BASOPHILS NFR BLD AUTO: 0.6 %
BILIRUB SERPL-MCNC: 0.3 MG/DL
BUN SERPL-MCNC: 13 MG/DL
CALCIUM SERPL-MCNC: 9.9 MG/DL
CHLORIDE SERPL-SCNC: 106 MMOL/L
CHOLEST SERPL-MCNC: 169 MG/DL
CO2 SERPL-SCNC: 23 MMOL/L
CREAT SERPL-MCNC: 0.98 MG/DL
EOSINOPHIL # BLD AUTO: 0.23 K/UL
EOSINOPHIL NFR BLD AUTO: 4.6 %
ESTIMATED AVERAGE GLUCOSE: 128 MG/DL
GLUCOSE SERPL-MCNC: 97 MG/DL
HBA1C MFR BLD HPLC: 6.1 %
HCT VFR BLD CALC: 43 %
HDLC SERPL-MCNC: 44 MG/DL
HGB BLD-MCNC: 14.3 G/DL
IMM GRANULOCYTES NFR BLD AUTO: 0.2 %
LDLC SERPL CALC-MCNC: 95 MG/DL
LYMPHOCYTES # BLD AUTO: 1.79 K/UL
LYMPHOCYTES NFR BLD AUTO: 36.1 %
MAN DIFF?: NORMAL
MCHC RBC-ENTMCNC: 28.9 PG
MCHC RBC-ENTMCNC: 33.3 GM/DL
MCV RBC AUTO: 86.9 FL
MONOCYTES # BLD AUTO: 0.35 K/UL
MONOCYTES NFR BLD AUTO: 7.1 %
NEUTROPHILS # BLD AUTO: 2.55 K/UL
NEUTROPHILS NFR BLD AUTO: 51.4 %
NONHDLC SERPL-MCNC: 125 MG/DL
PLATELET # BLD AUTO: 195 K/UL
POTASSIUM SERPL-SCNC: 4.4 MMOL/L
PROT SERPL-MCNC: 6.9 G/DL
RBC # BLD: 4.95 M/UL
RBC # FLD: 12.7 %
SODIUM SERPL-SCNC: 140 MMOL/L
TRIGL SERPL-MCNC: 149 MG/DL
TSH SERPL-ACNC: 2.7 UIU/ML
VIT B12 SERPL-MCNC: 545 PG/ML
WBC # FLD AUTO: 4.96 K/UL

## 2021-04-13 ENCOUNTER — APPOINTMENT (OUTPATIENT)
Dept: DERMATOLOGY | Facility: CLINIC | Age: 66
End: 2021-04-13
Payer: MEDICARE

## 2021-04-13 DIAGNOSIS — R20.2 PARESTHESIA OF SKIN: ICD-10-CM

## 2021-04-13 PROCEDURE — 99214 OFFICE O/P EST MOD 30 MIN: CPT | Mod: GC

## 2021-04-13 PROCEDURE — 99072 ADDL SUPL MATRL&STAF TM PHE: CPT

## 2021-04-15 ENCOUNTER — RX RENEWAL (OUTPATIENT)
Age: 66
End: 2021-04-15

## 2021-04-15 ENCOUNTER — APPOINTMENT (OUTPATIENT)
Dept: FAMILY MEDICINE | Facility: CLINIC | Age: 66
End: 2021-04-15
Payer: MEDICARE

## 2021-04-15 VITALS
TEMPERATURE: 98.2 F | HEART RATE: 70 BPM | DIASTOLIC BLOOD PRESSURE: 80 MMHG | SYSTOLIC BLOOD PRESSURE: 110 MMHG | OXYGEN SATURATION: 98 % | BODY MASS INDEX: 27.29 KG/M2 | HEIGHT: 63 IN | WEIGHT: 154 LBS

## 2021-04-15 DIAGNOSIS — L40.9 PSORIASIS, UNSPECIFIED: ICD-10-CM

## 2021-04-15 DIAGNOSIS — M15.8 OTHER POLYOSTEOARTHRITIS: ICD-10-CM

## 2021-04-15 PROCEDURE — 99213 OFFICE O/P EST LOW 20 MIN: CPT

## 2021-04-15 PROCEDURE — 99072 ADDL SUPL MATRL&STAF TM PHE: CPT

## 2021-04-15 RX ORDER — MONTELUKAST 10 MG/1
10 TABLET, FILM COATED ORAL DAILY
Qty: 14 | Refills: 0 | Status: DISCONTINUED | COMMUNITY
Start: 2020-01-27 | End: 2021-04-15

## 2021-04-15 RX ORDER — BETAMETHASONE DIPROPIONATE 0.5 MG/G
0.05 OINTMENT, AUGMENTED TOPICAL
Qty: 1 | Refills: 4 | Status: DISCONTINUED | COMMUNITY
Start: 2021-04-13 | End: 2021-04-15

## 2021-04-15 RX ORDER — SILDENAFIL 100 MG/1
100 TABLET, FILM COATED ORAL
Qty: 20 | Refills: 3 | Status: DISCONTINUED | COMMUNITY
Start: 2020-07-02 | End: 2021-04-15

## 2021-04-29 ENCOUNTER — APPOINTMENT (OUTPATIENT)
Dept: UROLOGY | Facility: CLINIC | Age: 66
End: 2021-04-29

## 2021-07-08 ENCOUNTER — APPOINTMENT (OUTPATIENT)
Dept: UROLOGY | Facility: CLINIC | Age: 66
End: 2021-07-08
Payer: MEDICARE

## 2021-07-08 DIAGNOSIS — D30.00 BENIGN NEOPLASM OF UNSPECIFIED KIDNEY: ICD-10-CM

## 2021-07-08 PROCEDURE — 99214 OFFICE O/P EST MOD 30 MIN: CPT

## 2021-07-08 PROCEDURE — 88112 CYTOPATH CELL ENHANCE TECH: CPT | Mod: 26

## 2021-07-09 LAB
APPEARANCE: CLEAR
BACTERIA: NEGATIVE
BILIRUBIN URINE: NEGATIVE
BLOOD URINE: NEGATIVE
CALCIUM OXALATE CRYSTALS: ABNORMAL
COLOR: YELLOW
GLUCOSE QUALITATIVE U: NEGATIVE
HYALINE CASTS: 0 /LPF
KETONES URINE: NEGATIVE
LEUKOCYTE ESTERASE URINE: NEGATIVE
MICROSCOPIC-UA: NORMAL
NITRITE URINE: NEGATIVE
PH URINE: 6
PROTEIN URINE: ABNORMAL
PSA FREE FLD-MCNC: NORMAL %
PSA FREE SERPL-MCNC: <0.01 NG/ML
PSA SERPL-MCNC: <0.01 NG/ML
RED BLOOD CELLS URINE: 1 /HPF
SPECIFIC GRAVITY URINE: 1.04
SQUAMOUS EPITHELIAL CELLS: 0 /HPF
UROBILINOGEN URINE: NORMAL
WHITE BLOOD CELLS URINE: 3 /HPF

## 2021-07-10 LAB — URINE CYTOLOGY: NORMAL

## 2021-10-13 ENCOUNTER — APPOINTMENT (OUTPATIENT)
Dept: UROLOGY | Facility: CLINIC | Age: 66
End: 2021-10-13
Payer: MEDICARE

## 2021-10-13 ENCOUNTER — NON-APPOINTMENT (OUTPATIENT)
Age: 66
End: 2021-10-13

## 2021-10-13 PROCEDURE — 99213 OFFICE O/P EST LOW 20 MIN: CPT

## 2021-11-12 ENCOUNTER — APPOINTMENT (OUTPATIENT)
Dept: FAMILY MEDICINE | Facility: CLINIC | Age: 66
End: 2021-11-12
Payer: MEDICARE

## 2021-11-12 ENCOUNTER — NON-APPOINTMENT (OUTPATIENT)
Age: 66
End: 2021-11-12

## 2021-11-12 VITALS
HEART RATE: 76 BPM | WEIGHT: 152 LBS | SYSTOLIC BLOOD PRESSURE: 116 MMHG | DIASTOLIC BLOOD PRESSURE: 78 MMHG | TEMPERATURE: 98.4 F | BODY MASS INDEX: 26.93 KG/M2 | OXYGEN SATURATION: 97 % | HEIGHT: 63 IN

## 2021-11-12 DIAGNOSIS — M47.816 SPONDYLOSIS W/OUT MYELOPATHY OR RADICULOPATHY, LUMBAR REGION: ICD-10-CM

## 2021-11-12 DIAGNOSIS — Z23 ENCOUNTER FOR IMMUNIZATION: ICD-10-CM

## 2021-11-12 PROCEDURE — G0009: CPT

## 2021-11-12 PROCEDURE — 93000 ELECTROCARDIOGRAM COMPLETE: CPT

## 2021-11-12 PROCEDURE — 90732 PPSV23 VACC 2 YRS+ SUBQ/IM: CPT

## 2021-11-12 PROCEDURE — 36415 COLL VENOUS BLD VENIPUNCTURE: CPT

## 2021-11-12 PROCEDURE — G0438: CPT

## 2021-11-12 RX ORDER — TRIAMCINOLONE ACETONIDE 1 MG/G
0.1 OINTMENT TOPICAL
Qty: 1 | Refills: 0 | Status: DISCONTINUED | COMMUNITY
Start: 2021-03-30 | End: 2021-11-12

## 2021-11-12 RX ORDER — SILDENAFIL 20 MG/1
20 TABLET ORAL
Qty: 90 | Refills: 3 | Status: DISCONTINUED | COMMUNITY
Start: 2019-01-31 | End: 2021-11-12

## 2021-11-12 RX ORDER — MUPIROCIN 20 MG/G
2 OINTMENT TOPICAL
Qty: 22 | Refills: 0 | Status: DISCONTINUED | COMMUNITY
Start: 2018-04-09 | End: 2021-11-12

## 2021-11-12 RX ORDER — INDOMETHACIN 25 MG/1
25 CAPSULE ORAL 3 TIMES DAILY
Qty: 21 | Refills: 0 | Status: DISCONTINUED | COMMUNITY
Start: 2021-04-15 | End: 2021-11-12

## 2021-11-12 RX ORDER — CYCLOBENZAPRINE HYDROCHLORIDE 5 MG/1
5 TABLET, FILM COATED ORAL
Qty: 30 | Refills: 1 | Status: DISCONTINUED | COMMUNITY
Start: 2020-07-31 | End: 2021-11-12

## 2021-11-12 RX ORDER — SILDENAFIL 100 MG/1
100 TABLET, FILM COATED ORAL
Qty: 12 | Refills: 7 | Status: DISCONTINUED | COMMUNITY
Start: 2019-01-30 | End: 2021-11-12

## 2021-11-12 RX ORDER — KETOCONAZOLE 20 MG/G
2 CREAM TOPICAL TWICE DAILY
Qty: 1 | Refills: 11 | Status: DISCONTINUED | COMMUNITY
Start: 2021-10-13 | End: 2021-11-12

## 2021-11-12 RX ORDER — MELOXICAM 15 MG/1
15 TABLET ORAL
Qty: 30 | Refills: 1 | Status: DISCONTINUED | COMMUNITY
Start: 2020-07-31 | End: 2021-11-12

## 2021-11-12 RX ORDER — CIPROFLOXACIN HYDROCHLORIDE 250 MG/1
250 TABLET, FILM COATED ORAL
Qty: 10 | Refills: 0 | Status: DISCONTINUED | COMMUNITY
Start: 2021-01-07 | End: 2021-11-12

## 2021-11-12 NOTE — HISTORY OF PRESENT ILLNESS
[FreeTextEntry1] : 66 year old male who presents today for a complete physical exam.\par c/o chronic lbp, tried injections, codeine, massage, pain killers, PT, states nothing helps

## 2021-11-12 NOTE — PHYSICAL EXAM
[No Acute Distress] : no acute distress [Well Nourished] : well nourished [Well Developed] : well developed [Well-Appearing] : well-appearing [Normal Sclera/Conjunctiva] : normal sclera/conjunctiva [PERRL] : pupils equal round and reactive to light [EOMI] : extraocular movements intact [Normal Outer Ear/Nose] : the outer ears and nose were normal in appearance [No JVD] : no jugular venous distention [No Lymphadenopathy] : no lymphadenopathy [Supple] : supple [Thyroid Normal, No Nodules] : the thyroid was normal and there were no nodules present [No Respiratory Distress] : no respiratory distress  [No Accessory Muscle Use] : no accessory muscle use [Clear to Auscultation] : lungs were clear to auscultation bilaterally [Normal Rate] : normal rate  [Regular Rhythm] : with a regular rhythm [Normal S1, S2] : normal S1 and S2 [No Murmur] : no murmur heard [No Carotid Bruits] : no carotid bruits [No Abdominal Bruit] : a ~M bruit was not heard ~T in the abdomen [No Varicosities] : no varicosities [No Edema] : there was no peripheral edema [No Palpable Aorta] : no palpable aorta [No Extremity Clubbing/Cyanosis] : no extremity clubbing/cyanosis [Soft] : abdomen soft [Non Tender] : non-tender [Non-distended] : non-distended [No Masses] : no abdominal mass palpated [No HSM] : no HSM [Normal Bowel Sounds] : normal bowel sounds [Normal Posterior Cervical Nodes] : no posterior cervical lymphadenopathy [Normal Anterior Cervical Nodes] : no anterior cervical lymphadenopathy [No CVA Tenderness] : no CVA  tenderness [No Spinal Tenderness] : no spinal tenderness [No Joint Swelling] : no joint swelling [Grossly Normal Strength/Tone] : grossly normal strength/tone [No Rash] : no rash [Coordination Grossly Intact] : coordination grossly intact [No Focal Deficits] : no focal deficits [Normal Gait] : normal gait [Normal Affect] : the affect was normal [Alert and Oriented x3] : oriented to person, place, and time [Normal Insight/Judgement] : insight and judgment were intact [de-identified] : impacted cerumen [de-identified] : +lumbar tenderness

## 2021-11-12 NOTE — HEALTH RISK ASSESSMENT
[Good] : ~his/her~ current health as good [] : No [Yes] : Yes [Monthly or less (1 pt)] : Monthly or less (1 point) [1 or 2 (0 pts)] : 1 or 2 (0 points) [Never (0 pts)] : Never (0 points) [No] : In the past 12 months have you used drugs other than those required for medical reasons? No [de-identified] : uro, neurosxn [de-identified] : stretching, treadmill [Change in mental status noted] : No change in mental status noted [Language] : denies difficulty with language [Handling Complex Tasks] : denies difficulty handling complex tasks [With Family] : lives with family [] :  [# Of Children ___] : has [unfilled] children [Sexually Active] : sexually active [Feels Safe at Home] : Feels safe at home [Fully functional (bathing, dressing, toileting, transferring, walking, feeding)] : Fully functional (bathing, dressing, toileting, transferring, walking, feeding) [Fully functional (using the telephone, shopping, preparing meals, housekeeping, doing laundry, using] : Fully functional and needs no help or supervision to perform IADLs (using the telephone, shopping, preparing meals, housekeeping, doing laundry, using transportation, managing medications and managing finances) [Reports changes in hearing] : Reports no changes in hearing [Reports changes in vision] : Reports no changes in vision [Reports changes in dental health] : Reports no changes in dental health [Seat Belt] :  uses seat belt [Travel to Developing Areas] : does not  travel to developing areas [ColonoscopyDate] : 2021 [de-identified] : wife and dgtr [FreeTextEntry2] : occasional

## 2021-11-15 LAB
25(OH)D3 SERPL-MCNC: 33.4 NG/ML
ALBUMIN SERPL ELPH-MCNC: 4.7 G/DL
ALP BLD-CCNC: 98 U/L
ALT SERPL-CCNC: 24 U/L
ANION GAP SERPL CALC-SCNC: 11 MMOL/L
APPEARANCE: CLEAR
AST SERPL-CCNC: 20 U/L
BASOPHILS # BLD AUTO: 0.04 K/UL
BASOPHILS NFR BLD AUTO: 0.7 %
BILIRUB SERPL-MCNC: 0.3 MG/DL
BILIRUBIN URINE: NEGATIVE
BLOOD URINE: NEGATIVE
BUN SERPL-MCNC: 16 MG/DL
CALCIUM SERPL-MCNC: 10 MG/DL
CHLORIDE SERPL-SCNC: 105 MMOL/L
CHOLEST SERPL-MCNC: 212 MG/DL
CO2 SERPL-SCNC: 22 MMOL/L
COLOR: COLORLESS
CREAT SERPL-MCNC: 1.01 MG/DL
EOSINOPHIL # BLD AUTO: 0.26 K/UL
EOSINOPHIL NFR BLD AUTO: 4.5 %
GLUCOSE QUALITATIVE U: NEGATIVE
GLUCOSE SERPL-MCNC: 86 MG/DL
HCT VFR BLD CALC: 45.1 %
HDLC SERPL-MCNC: 51 MG/DL
HGB BLD-MCNC: 14.9 G/DL
IMM GRANULOCYTES NFR BLD AUTO: 0.2 %
KETONES URINE: NEGATIVE
LDLC SERPL CALC-MCNC: 131 MG/DL
LEUKOCYTE ESTERASE URINE: NEGATIVE
LYMPHOCYTES # BLD AUTO: 2.37 K/UL
LYMPHOCYTES NFR BLD AUTO: 41 %
MAN DIFF?: NORMAL
MCHC RBC-ENTMCNC: 28.8 PG
MCHC RBC-ENTMCNC: 33 GM/DL
MCV RBC AUTO: 87.2 FL
MONOCYTES # BLD AUTO: 0.28 K/UL
MONOCYTES NFR BLD AUTO: 4.8 %
NEUTROPHILS # BLD AUTO: 2.82 K/UL
NEUTROPHILS NFR BLD AUTO: 48.8 %
NITRITE URINE: NEGATIVE
NONHDLC SERPL-MCNC: 162 MG/DL
PH URINE: 5.5
PLATELET # BLD AUTO: 210 K/UL
POTASSIUM SERPL-SCNC: 4.5 MMOL/L
PROT SERPL-MCNC: 7.3 G/DL
PROTEIN URINE: NEGATIVE
RBC # BLD: 5.17 M/UL
RBC # FLD: 12.7 %
SODIUM SERPL-SCNC: 138 MMOL/L
SPECIFIC GRAVITY URINE: 1.01
TRIGL SERPL-MCNC: 155 MG/DL
TSH SERPL-ACNC: 2.58 UIU/ML
UROBILINOGEN URINE: NORMAL
WBC # FLD AUTO: 5.78 K/UL

## 2022-01-19 ENCOUNTER — APPOINTMENT (OUTPATIENT)
Dept: FAMILY MEDICINE | Facility: CLINIC | Age: 67
End: 2022-01-19
Payer: MEDICARE

## 2022-01-19 PROCEDURE — 99442: CPT

## 2022-02-12 ENCOUNTER — RX RENEWAL (OUTPATIENT)
Age: 67
End: 2022-02-12

## 2022-02-18 ENCOUNTER — APPOINTMENT (OUTPATIENT)
Dept: OPHTHALMOLOGY | Facility: CLINIC | Age: 67
End: 2022-02-18
Payer: MEDICARE

## 2022-02-18 ENCOUNTER — NON-APPOINTMENT (OUTPATIENT)
Age: 67
End: 2022-02-18

## 2022-02-18 PROCEDURE — 99213 OFFICE O/P EST LOW 20 MIN: CPT

## 2022-06-15 ENCOUNTER — APPOINTMENT (OUTPATIENT)
Dept: FAMILY MEDICINE | Facility: CLINIC | Age: 67
End: 2022-06-15
Payer: MEDICARE

## 2022-06-15 ENCOUNTER — NON-APPOINTMENT (OUTPATIENT)
Age: 67
End: 2022-06-15

## 2022-06-15 VITALS
HEART RATE: 87 BPM | DIASTOLIC BLOOD PRESSURE: 80 MMHG | HEIGHT: 63 IN | TEMPERATURE: 97.6 F | SYSTOLIC BLOOD PRESSURE: 118 MMHG | OXYGEN SATURATION: 97 % | BODY MASS INDEX: 28.35 KG/M2 | WEIGHT: 160 LBS

## 2022-06-15 DIAGNOSIS — E66.3 OVERWEIGHT: ICD-10-CM

## 2022-06-15 DIAGNOSIS — Z87.09 PERSONAL HISTORY OF OTHER DISEASES OF THE RESPIRATORY SYSTEM: ICD-10-CM

## 2022-06-15 DIAGNOSIS — R42 DIZZINESS AND GIDDINESS: ICD-10-CM

## 2022-06-15 DIAGNOSIS — Z23 ENCOUNTER FOR IMMUNIZATION: ICD-10-CM

## 2022-06-15 PROCEDURE — 36415 COLL VENOUS BLD VENIPUNCTURE: CPT

## 2022-06-15 PROCEDURE — 93000 ELECTROCARDIOGRAM COMPLETE: CPT

## 2022-06-15 PROCEDURE — 99215 OFFICE O/P EST HI 40 MIN: CPT | Mod: 25

## 2022-06-15 RX ORDER — ETODOLAC 400 MG/1
400 TABLET, FILM COATED ORAL TWICE DAILY
Qty: 60 | Refills: 1 | Status: DISCONTINUED | COMMUNITY
Start: 2021-11-12 | End: 2022-06-15

## 2022-06-15 RX ORDER — PREDNISONE 20 MG/1
20 TABLET ORAL
Qty: 12 | Refills: 0 | Status: DISCONTINUED | COMMUNITY
Start: 2022-01-19 | End: 2022-06-15

## 2022-06-15 RX ORDER — AZITHROMYCIN 250 MG/1
250 TABLET, FILM COATED ORAL
Qty: 1 | Refills: 0 | Status: DISCONTINUED | COMMUNITY
Start: 2022-01-19 | End: 2022-06-15

## 2022-06-15 RX ORDER — BETAMETHASONE DIPROPIONATE 0.5 MG/G
0.05 OINTMENT TOPICAL
Qty: 1 | Refills: 1 | Status: DISCONTINUED | COMMUNITY
Start: 2018-04-09 | End: 2022-06-15

## 2022-06-15 NOTE — HEALTH RISK ASSESSMENT
[Never] : Never [Yes] : Yes [Monthly or less (1 pt)] : Monthly or less (1 point) [1 or 2 (0 pts)] : 1 or 2 (0 points) [Never (0 pts)] : Never (0 points) [No] : In the past 12 months have you used drugs other than those required for medical reasons? No

## 2022-06-16 LAB
25(OH)D3 SERPL-MCNC: 32 NG/ML
ALBUMIN SERPL ELPH-MCNC: 4.5 G/DL
ALP BLD-CCNC: 91 U/L
ALT SERPL-CCNC: 21 U/L
ANION GAP SERPL CALC-SCNC: 13 MMOL/L
AST SERPL-CCNC: 17 U/L
BASOPHILS # BLD AUTO: 0.04 K/UL
BASOPHILS NFR BLD AUTO: 0.7 %
BILIRUB SERPL-MCNC: 0.3 MG/DL
BUN SERPL-MCNC: 20 MG/DL
CALCIUM SERPL-MCNC: 9.6 MG/DL
CHLORIDE SERPL-SCNC: 103 MMOL/L
CHOLEST SERPL-MCNC: 232 MG/DL
CO2 SERPL-SCNC: 23 MMOL/L
CREAT SERPL-MCNC: 0.97 MG/DL
EGFR: 86 ML/MIN/1.73M2
EOSINOPHIL # BLD AUTO: 0.21 K/UL
EOSINOPHIL NFR BLD AUTO: 3.9 %
ESTIMATED AVERAGE GLUCOSE: 126 MG/DL
FERRITIN SERPL-MCNC: 108 NG/ML
FOLATE SERPL-MCNC: 9.7 NG/ML
GLUCOSE SERPL-MCNC: 126 MG/DL
HBA1C MFR BLD HPLC: 6 %
HCT VFR BLD CALC: 41.1 %
HDLC SERPL-MCNC: 47 MG/DL
HGB BLD-MCNC: 13.7 G/DL
IMM GRANULOCYTES NFR BLD AUTO: 0.4 %
IRON SATN MFR SERPL: 28 %
IRON SERPL-MCNC: 80 UG/DL
LDLC SERPL CALC-MCNC: 127 MG/DL
LYMPHOCYTES # BLD AUTO: 1.75 K/UL
LYMPHOCYTES NFR BLD AUTO: 32.7 %
MAN DIFF?: NORMAL
MCHC RBC-ENTMCNC: 29.3 PG
MCHC RBC-ENTMCNC: 33.3 GM/DL
MCV RBC AUTO: 87.8 FL
MONOCYTES # BLD AUTO: 0.37 K/UL
MONOCYTES NFR BLD AUTO: 6.9 %
NEUTROPHILS # BLD AUTO: 2.96 K/UL
NEUTROPHILS NFR BLD AUTO: 55.4 %
NONHDLC SERPL-MCNC: 185 MG/DL
PLATELET # BLD AUTO: 198 K/UL
POTASSIUM SERPL-SCNC: 4.5 MMOL/L
PROT SERPL-MCNC: 6.9 G/DL
RBC # BLD: 4.68 M/UL
RBC # FLD: 13.3 %
SODIUM SERPL-SCNC: 139 MMOL/L
TIBC SERPL-MCNC: 287 UG/DL
TRIGL SERPL-MCNC: 288 MG/DL
UIBC SERPL-MCNC: 207 UG/DL
VIT B12 SERPL-MCNC: 386 PG/ML
WBC # FLD AUTO: 5.35 K/UL

## 2022-06-16 NOTE — PLAN
[FreeTextEntry1] : f/u w/ optho\par low carb/sugar/fat diet\par chk bw\par ear wax cleanser, plan disimpaction\par EKG NSR

## 2022-06-16 NOTE — REVIEW OF SYSTEMS
[Dizziness] : dizziness [Negative] : Psychiatric [Fainting] : no fainting [Unsteady Walk] : no ataxia

## 2022-06-16 NOTE — PHYSICAL EXAM
[No Acute Distress] : no acute distress [Well Nourished] : well nourished [Normal Sclera/Conjunctiva] : normal sclera/conjunctiva [Normal Outer Ear/Nose] : the outer ears and nose were normal in appearance [No JVD] : no jugular venous distention [Normal] : normal rate, regular rhythm, normal S1 and S2 and no murmur heard [Coordination Grossly Intact] : coordination grossly intact [Normal Affect] : the affect was normal [Alert and Oriented x3] : oriented to person, place, and time [Normal Insight/Judgement] : insight and judgment were intact [PERRL] : pupils equal round and reactive to light [EOMI] : extraocular movements intact [No Focal Deficits] : no focal deficits [Normal Gait] : normal gait [de-identified] : impacted cerumen b/l [de-identified] : no drift/dysmetria

## 2022-06-16 NOTE — HISTORY OF PRESENT ILLNESS
[FreeTextEntry1] : here for follow up\par c/o dizziness on and off x 3 wks, now everyday for the past wk\par denies dizziness at this time, last episode this am

## 2022-06-17 LAB — PSA SERPL-MCNC: <0.01 NG/ML

## 2022-07-13 ENCOUNTER — APPOINTMENT (OUTPATIENT)
Dept: UROLOGY | Facility: CLINIC | Age: 67
End: 2022-07-13

## 2022-07-13 ENCOUNTER — LABORATORY RESULT (OUTPATIENT)
Age: 67
End: 2022-07-13

## 2022-07-13 VITALS
HEIGHT: 64 IN | SYSTOLIC BLOOD PRESSURE: 131 MMHG | OXYGEN SATURATION: 96 % | HEART RATE: 75 BPM | TEMPERATURE: 97.8 F | DIASTOLIC BLOOD PRESSURE: 81 MMHG

## 2022-07-13 DIAGNOSIS — N39.0 URINARY TRACT INFECTION, SITE NOT SPECIFIED: ICD-10-CM

## 2022-07-13 DIAGNOSIS — N28.1 CYST OF KIDNEY, ACQUIRED: ICD-10-CM

## 2022-07-13 DIAGNOSIS — Z85.46 PERSONAL HISTORY OF MALIGNANT NEOPLASM OF PROSTATE: ICD-10-CM

## 2022-07-13 PROCEDURE — 99215 OFFICE O/P EST HI 40 MIN: CPT

## 2022-07-13 PROCEDURE — 51798 US URINE CAPACITY MEASURE: CPT

## 2022-07-13 RX ORDER — TIZANIDINE 2 MG/1
2 TABLET ORAL
Qty: 60 | Refills: 1 | Status: DISCONTINUED | COMMUNITY
Start: 2021-11-12 | End: 2022-07-13

## 2022-07-13 RX ORDER — HYDROCORTISONE ACETATE 25 MG/1
25 SUPPOSITORY RECTAL
Qty: 24 | Refills: 0 | Status: DISCONTINUED | COMMUNITY
Start: 2022-02-15

## 2022-07-13 NOTE — PHYSICAL EXAM
[General Appearance - Well Developed] : well developed [General Appearance - Well Nourished] : well nourished [Normal Appearance] : normal appearance [Well Groomed] : well groomed [General Appearance - In No Acute Distress] : no acute distress [Edema] : no peripheral edema [Respiration, Rhythm And Depth] : normal respiratory rhythm and effort [Exaggerated Use Of Accessory Muscles For Inspiration] : no accessory muscle use [Abdomen Soft] : soft [Abdomen Tenderness] : non-tender [Costovertebral Angle Tenderness] : no ~M costovertebral angle tenderness [Urethral Meatus] : meatus normal [Urinary Bladder Findings] : the bladder was normal on palpation [Scrotum] : the scrotum was normal [Epididymis] : the epididymides were normal [Testes Tenderness] : no tenderness of the testes [Testes Mass (___cm)] : there were no testicular masses [Anus Abnormality] : the anus and perineum were normal [Rectal Exam - Rectum] : digital rectal exam was normal [Prostate Enlargement] : the prostate was not enlarged [No Prostate Nodules] : no prostate nodules [Prostate Size ___ (0-4)] : prostate size [unfilled] (scale: 0-4) [Normal Station and Gait] : the gait and station were normal for the patient's age [] : no rash [No Focal Deficits] : no focal deficits [Oriented To Time, Place, And Person] : oriented to person, place, and time [Affect] : the affect was normal [Mood] : the mood was normal [Not Anxious] : not anxious [No Palpable Adenopathy] : no palpable adenopathy

## 2022-07-13 NOTE — LETTER BODY
[Dear  ___] : Dear  [unfilled], [Consult Letter:] : I had the pleasure of evaluating your patient, [unfilled]. [Please see my note below.] : Please see my note below. [Consult Closing:] : Thank you very much for allowing me to participate in the care of this patient.  If you have any questions, please do not hesitate to contact me. [Sincerely,] : Sincerely, [FreeTextEntry3] : Khoi Goncalves MD\par

## 2022-07-13 NOTE — REVIEW OF SYSTEMS
[Recent Weight Loss (___ Lbs)] : recent [unfilled] ~Ulb weight loss [Slow urine stream] : slow urine stream [Negative] : Heme/Lymph [see HPI] : see HPI

## 2022-07-13 NOTE — HISTORY OF PRESENT ILLNESS
[FreeTextEntry1] : 07/13/2022: Mr. HUNT is a 67 year male  is here for Ca Prostate and ED.\par \par Ca Prostate: diagnosed in 2007. Biopsy Caridad Score 6, PSA 3.4 ng/ml, treated with I 125  Seed implant.\par Has increased frequency in the night X 4, most likely secondary to radiation.\par On Tamsulosin. No significant residual urine. PSA: 6/15/22:  0.01 ng/mL   \par \par Mr. Hunt is having back pain. 2011 he had 2 cysts in the kidney. and an inderminate 1.5 cm right renal mass, no change as of last CT 2021. Repeat CT scan requested.\par \par Mr. Hunt is stating his urinary stream and flow is slow. Patient is voiding and emptying bladder completely.  PVR: 0. N x4-  5.  Day time : q 4 h. On Tamsulosin.\par \par ED: has poor erections and was given Sildenafil and is not helping. He has soft erections, but will not last. Mr. Hunt was educated how to use the medication properly, and medicine renewed.\par \par RTC: one month to check CT scan.

## 2022-07-14 ENCOUNTER — APPOINTMENT (OUTPATIENT)
Dept: UROLOGY | Facility: CLINIC | Age: 67
End: 2022-07-14

## 2022-07-14 LAB
APPEARANCE: ABNORMAL
BILIRUBIN URINE: NEGATIVE
BLOOD URINE: NEGATIVE
COLOR: YELLOW
GLUCOSE QUALITATIVE U: NEGATIVE
KETONES URINE: NEGATIVE
LEUKOCYTE ESTERASE URINE: NEGATIVE
NITRITE URINE: NEGATIVE
PH URINE: 6
PROTEIN URINE: ABNORMAL
SPECIFIC GRAVITY URINE: 1.03
UROBILINOGEN URINE: NORMAL

## 2022-07-15 ENCOUNTER — RESULT REVIEW (OUTPATIENT)
Age: 67
End: 2022-07-15

## 2022-07-18 NOTE — ED ADULT NURSE NOTE - NSIMPLEMENTINTERV_GEN_ALL_ED
RHEUMATOLOGY FOLLOW UP VISIT         Chief Complaint  SLE    Subjective:  Ms Zuñiga is a pleasant 58-year-old female presenting for follow-up for SLE and suspected APLS with hx of PE on coumadin. Reports arthralgias are still doing well. No swelling of joints however for many years has been struggleing with severe cramping pain in the legs.  Last episode was about 3-4 m ago and lasted about 4 hours. Cannot walk when this happens. Usually in right leg.   Scalp biopsy showed lichen plano-pilaris.   Switched to Coumadin from Xarelto b y hematology  Denies new rashes, sob, chest pain, fevers, oral ulcers.     Interim visits  3/14/22  Overall feeling better.  No joint pain recently.  Did see dermatology and got biopsy of the scalp today.  Denies any side effects from hydroxychloroquine.  Raynaud's has been well controlled.  She has had an excisional lymph node biopsy which was negative for malignancy.  Is following with hematology as well.    Initial HPI 11/2021:  Neva Zuñiga is a 58 year old female presenting for evaluation of abnormal labs. Pt has had multiple hospitalizations recently - reviewed outside records. Per patient. Pt woke up around 5am on Oct 9th to go to bathroom. Remembers feeling hot, nauseous and then passing out. No one was there to witness the fall however thinks she was out for about 15 min. She remembers waking up with blood on her hands. In the ER patient was found to have pancytopenia with elevated D-dimer\CT scan chest showed small acute to subacute PE in the right superior segmental pulmonary artery along with right axillary LAD. She was also febrile. She was admitted and had extensive w/u. CT PE + for subsegmental PE. LE dopplers did show superficial thrombosis but no DVT. She had MRI brain which was unremarkable. LP with extensive ID workup including meningoencephalitis panel which was normal. LP cell count, glucose, protein was all normal.   She was noted to have marked axillary, inguinal  LAD and CT PE chest showed mediastinal mass. Pt reports she remembers swollen lymph nodes in the groin almost the size of a grape.   She was evaluated by heme/onc and under went axillary LN core biopsy and bone marrow biopsy (see results below). Findings not c/w lymphoma however excisional biopsy recommended. She had f/u with surgery however her LAD had resolved on f/u. Per records plan was to monitor and do PET scan in 1m  She did have autoimmune w/u in the hospital with multiple + ab - JAYLYN, dsdna, crithidia, SSA, Thais-1, ANCA were all positive.  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     Pt has had multiple ED visits for severe headaches since discharge as well as elevated BP. Pt reports hx of migraines however these headaches were different and worse. Nothing she would do would help with the headaches. + nausea, diarrhea associated with this. CT head was normal. She is now on a blood pressure medication. No headaches recently    Today pt states fevers has not recurred for the past 2-3 weeks.   She has remote hx of left breast nodule more than 25 years ago however ca not confirmed with biopsy.   Did have a mammogram recently which was normal    Assessment by hematology during outpatient visit:     1. Axillary lymphadenopathy  Patient was found to have enlarged right axillary lymph node and upper anterior mediastinal lymphadenopathy  On today exam the lymph node in the right axillary area this prominent     Bone marrow flow cytometry showed monoclonal B-cell population 0.6% concerning for lymphoma however biopsy was benign.  The main differential diagnosis based on the flow cytometry immunophenotype includes, but is not limited to, a monoclonal B-cell lymphocytosis versus a marginal zone or lymphoplasmacytic lymphoma  Because of the monoclonal lymphocytosis from the bone marrow biopsy it was recommended by the pathologist to proceed with  Implemented All Fall with Harm Risk Interventions:  [place] to call system. Call bell, personal items and telephone within reach. Instruct patient to call for assistance. Room bathroom lighting operational. Non-slip footwear when patient is off stretcher. Physically safe environment: no spills, clutter or unnecessary equipment. Stretcher in lowest position, wheels locked, appropriate side rails in place. Provide visual cue, wrist band, yellow gown, etc. Monitor gait and stability. Monitor for mental status changes and reorient to person, place, and time. Review medications for side effects contributing to fall risk. Reinforce activity limits and safety measures with patient and family. Provide visual clues: red socks. excision lymph node biopsy  Patient will see general surgery next week to discuss excisional lymph node biopsy   I will discuss with the surgeon after he evaluated the patient we may consider doing PET scan before proceeding with surgery given the fact that the lymph node is this prominent on the exam today    2. Anemia  Better  Hgb 11 today  The patient denies any signs of bleeding  Will continue to monitor     3. Single subsegmental pulmonary embolism without acute cor pulmonale  Clinically stable  Most likely provoked by underlying autoimmune disorder , could be also provoked by underlying lymphoproliferative disorder  I encouraged the patient to continue Xarelto 20 mg po daily     4. Breast nodule  Patient will have mammogram next week    Will watch for those results     5. Fever  Patient had extensive infectious disease workup in the hospital  Resolved  Patient denies any fevers     7. Positive JAYLYN (antinuclear antibody)  Patient has symptoms concerning for lupus  Will place referral to Rheumatology     Today pt denies sicca symptoms, oral or digital ulcers, hx of blood clots except for most recent hospitalization, hx of miscarriages, psoriasis, chest pain, sob, heart palpitations, enthesitis, dactylitis, uveitis, back pain  Denies fevers, chills, n/v, abdominal pain,  dysuria, diarrhea currently    Is now on lisinopril  + raynauds - blue color change of finger tips with cold exposure for 6-7 years. No digital uclers  + 2 large patches of hair loss. Started 10 years ago. Has not been able to see dermatology yet. Does have hyperpigmented lesions on face as well.   Reports that she was diagnso  10-12 years ago was diagnosed with RA. There would be times where she had to be lifted up the stairs due to sudden onset pain in the joints. Remembers some swelling in the hands. Has had episodes with sudden onset knee, ankle or wrist pain at night which can last up to 2 hours. Will try to rub it out. Did not have  rheumatologic evaluation   Sometimes will wake up in the middle of the night with pain in knee, ankle, wrist. Can move. Can last up to 2 hours. Was given ibuprofen 800mg to take as needed.  + numbness tingling hands/feet - intermittent. Usually worse in mornings and night. And can get worse if sitting too long  + n/v, diarrhea - when headaches are bad  + travel to [place] in September for     ROS: 10 point ROS negative except those listed in HPI    PMHx:   Had a lump on the breast but no formal dx of breast ca. Just had mammogram which was normal    PSxHx:  Reconstructive surgery right face    Family Hx:   No family hx of AI disease    Allergies:   Sulfa - hives    Current Outpatient Medications   Medication Sig Dispense Refill   • warfarin (COUMADIN) 5 MG tablet TAKE 1 TO 1 AND 1/2 TABLETS BY MOUTH EVERY DAY     • warfarin (COUMADIN) 5 MG tablet Take 5-7.5 mg by mouth.     • hydroxychloroquine (PLAQUENIL) 200 MG tablet TAKE 1 AND 1/2 TABLETS BY MOUTH DAILY 45 tablet 3   • clobetasol (TEMOVATE) 0.05 % topical solution Apply topically 2 times daily. 50 mL 5   • NIFEdipine XL (PROCARDIA XL) 30 MG 24 hr tablet Take 30 mg by mouth at bedtime.     • calcium carbonate-vitamin D 600-200 MG-UNIT tablet Take 1 tablet by mouth every other day.     • diclofenac (diclofenac) 1 % gel Apply 4 g topically.     • lisinopril (ZESTRIL) 20 MG tablet Take 20 mg by mouth daily. For high blood pressure     • Multiple Vitamins-Minerals (vitamin - therapeutic multivitamins w/minerals) tablet Take 1 tablet by mouth.     • ondansetron (ZOFRAN ODT) 4 MG disintegrating tablet Take 4 mg by mouth.       No current facility-administered medications for this visit.          Physical Exam:  There were no vitals taken for this visit.   Constitutional: Ms. Zuñiga is a pleasant female who appears her stated age and is in no acute distress.    HEENT: Extra-ocular movements are intact, no conjunctivitis, no scleral icterus.   Cardiovascular: No  significant swelling on inspection or edema on palpation. RRR, no murmurs  Respiratory: Unlabored respirations, no audible wheezing. No wheezing, crackles or rales  Skin: Large patches of hair loss on scalp with peripheral scarring. Hyperpigmented macular lesions on face.  Psychiatric: Alert and oriented.  Mood and affect are appropriate.  Abdomin: no guarding  Musculoskeletal:   Shoulders: bilateral shoulders with normal ROM and without swelling or ttp  Elbows: bilateral elbows with normal ROM and without swelling or ttp  Wrists: bilateral wrists with normal ROM and without swelling or ttp   Hands: No swelling or tenderness in b/l MCP, PIP, or DIP. squarring of cmc joints   Knees: bilateral knees with normal ROM and without swelling or ttp   Ankles: bilateral ankles with normal ROM and without swelling or ttp   Feet: b/l feet with neg MTP squeeze test    Labs:   Most Recent Labs:    WHITE BLOOD CELL COUNT   Date Value Ref Range Status   12/20/2021 3.1 (L) 4.0 - 10.0 10*3/uL Final     HEMATOCRIT   Date Value Ref Range Status   12/20/2021 36.4 34.0 - 45.0 % Final     HEMOGLOBIN   Date Value Ref Range Status   12/20/2021 12.0 11.2 - 15.7 g/dL Final     PLATELET COUNT   Date Value Ref Range Status   12/20/2021 236 150 - 400 10*3/uL Final     NA (SODIUM, SERUM)   Date Value Ref Range Status   12/20/2021 140 136 - 146 mmol/L Final     K (POTASSIUM, SERUM)   Date Value Ref Range Status   12/20/2021 4.6 3.5 - 5.3 mmol/L Final     CHLORIDE, SERUM   Date Value Ref Range Status   12/20/2021 103 96 - 107 mmol/L Final     GLUCOSE, RANDOM   Date Value Ref Range Status   12/20/2021 83 70 - 200 mg/dL Final     CALCIUM, SERUM   Date Value Ref Range Status   12/20/2021 9.7 8.6 - 10.6 mg/dL Final     CO2 VENOUS   Date Value Ref Range Status   12/20/2021 29 22 - 32 mmol/L Final     BLOOD UREA NITROGEN   Date Value Ref Range Status   12/20/2021 16 7 - 20 mg/dL Final     CREATININE, SERUM   Date Value Ref Range Status   12/20/2021 0.9  0.5 - 1.4 mg/dL Final     SEDIMENTATION RATE, RBC   Date Value Ref Range Status   03/14/2022 19 0 - 20 mm/h Final   11/16/2021 27 (H) 0 - 20 mm/h Final     ALBUMIN, SERUM   Date Value Ref Range Status   12/20/2021 3.9 3.6 - 5.1 g/dL Final     ALKALINE PHOSPHATASE([id number])   Date Value Ref Range Status   12/20/2021 62 45 - 130 U/L Final     ALANINE AMINOTRANSFERASE(SGPT)   Date Value Ref Range Status   12/20/2021 25 4 - 38 U/L Final     ASPARTATE AMINOTRNSFRASE(SGOT)   Date Value Ref Range Status   12/20/2021 35 14 - 43 U/L Final     BILIRUBIN, TOTAL   Date Value Ref Range Status   12/20/2021 0.4 0.0 - 1.3 mg/dL Final     PROTEIN, TOTAL SERUM   Date Value Ref Range Status   12/20/2021 8.0 6.4 - 8.5 g/dL Final     Component      Latest Ref Rng & Units 3/14/2022   BETA 2 GLYCOPROTEIN IGG      <20 SGU <20   BETA 2 GLYCOPROTEIN IGA      <20 RUSTY >150 (H)   BETA 2 GLYCOPROTEIN IGM      <20 SMU <20   CARDIOLIPIN AB (IGA)      <20 APL 42 (H)   CARDIOLIPIN AB (IGG)      <20 GPL <20   CARDIOLIPIN AB (IGM)      <20 MPL <20   ESR      0 - 20 mm/h 19   C-REACTIVE PROTEIN      0.00 - 1.00 mg/dL <0.50   COMPLEMENT C4      14.0 - 44.0 mg/dL 14.1   COMPLEMENT C3      88 - 165 mg/dL 80 (L)       Exisional axillary LN biopsy 2/11/22  Final Diagnosis     A and B. Right axillary lymph nodes:  Benign lymph nodes.     Comment:  Scattered pigmented histiocytes are seen, suggestive of dermatopathic effect. Flow cytometric evaluation, performed and interpreted at [facility] laboratory, did not detect immunophenotypic evidence of lymphoproliferative disorder.   Cbc with diff wbc L 2.8. absolute neutrophil L 1.30 (normal 1.80-7.70)  CMP H total protein 8.3. remainder normal.    12/2021  Cbc with diff wbc L 2.9. absolute neutrophil L 1.30 (normal 1.80-7.70), Hgb L 11.4    11/16/21  AP: + U1RNP H 16.0 (0-5), SSA H 18 (0-7)  JAYLYN 1:1280  p ANCA + titer 1:1280 H. Neg MPO and PR3  ESR H 27  CRP wnl  Neg DsDNA  + crithidia  c4 L 9.9, c3 L 71  Anti  b2 glycoprotien + IgA > 150, normal IgM and IgG  Anti cardiolipin IgA + 45, normal IgM and IgG  Normal RF, CCP    Cbc with diff  11/11/2021  Wbc L 3.8, Hgb L 11.8 Hct L 35.5. normal MCV  Abs Lymph L 0.90  Normal ALT, AST, alk phos    11/7/21  Normal u/a. no protein or rbc  Neg hep b surface ag, hep b core ab, hep A, hep C ab  Normal crp, procalcitonin    11/4/21  U/a + LE + wbc, no protein or rbc    11/3/21    CSF: wbc 2, RBC 4, protein 27 (nml), LDH 12 (nml), glucose 63 (nml) macrophage H 100  ENINGITIS/ENCEPHALITIS PATHOGEN PANEL, PCR (11/03/2021 3:07 AM CDT)  MENINGITIS/ENCEPHALITIS PATHOGEN PANEL, PCR (11/03/2021 3:07 AM CDT)   Component Value Ref Range Performed At Pathologist Signature   Escherichia coli K1 by PCR Not Detected   [facility]     H. influenzae by PCR Not Detected   [facility]     Listeria monocytogenes by PCR Not Detected   [facility]     N. meningitidis by PCR Not Detected   [facility]     Streptococcus agalactiae by PCR Not Detected   [facility]     Streptococcus pneumoniae by PCR Not Detected   [facility]     Cytomegalovirus by PCR Not Detected   [facility]     Enterovirus by PCR Not Detected   [facility]     Herpes simplex 1 by PCR Not Detected   [facility]     Herpes simplex 2 by PCR Not Detected   [facility]     Human herpesvirus 6 by PCR Not Detected   [facility]     Human parechovirus by PCR Not Detected   [facility]     Varicella zoster virus by PCR Not Detected   [facility]     Cryptococcus neoformans by PCR Not Detected            10/26/21  Normal LDH    10/15/2021  AST H 165  ALT H 180  Alk phos H 104    10/12/21  ANCA >1:1280 P anca with neg MPO, PR3  JAYLYN nuclear 1:2560  JAYLYN homogenous 1:320  + anti Thais-1, + Ro52 with neg Ro60, + dsDNA H 173  dsDNA crithidia + 1:160  Neg scl 70, donnelly, ssb, RNP  HIV negative, normal ACE    10/10/21  Normal TSH  Normal lipid panel    10/9/21  Ferritin H 1221  L  iron 28  L transferrin 139  L TIBC 195  L transferrin saturation 14.4  D dimer > 1050    Right axillary lymph node, core biopsy 10/2021:  Benign appearing lymph node tissue.  See comment    Comment: No diagnostic features of lymphoma identified in the small fragments of submitted core.  No metastatic carcinoma is identified.  Sampling tissue cannot be completely excluded and small core biopsy.  Given the clinical suspicion of hematopoietic process in association with bone marrow flow cytometric finding, excisional biopsy of the lymph node would be suggested to exclude lymphoproliferative disorder if clinically indicated    Light microscopic description  Sections show small cores of lymph node tissue and adipose tissue.  No Hodgkin cells or atypical large lymphoid cells are seen.  A panel of immunohistochemical stains is performed to further evaluate the lymphoid cells.  The lymphocytes appear mixture of CD20 positive B cells and CD3 positive T cells.  No definitive lymphoid follicles are identified by CD23.  The B cells appear negative for CD10, BCL6, cyclin D1 and CD5.  BCL-2 interpretation is hampered by the mixed nature of B and T cells.  Pankeratin immunostain is negative for metastatic carcinoma.    Bone marrow biopsy 10/2021  Final diagnosis  Peripheral blood, bone marrow aspirate, clot section, core biopsy, touch imprints and flow cytometry:  -Peripheral blood with mild leukopenia and normocytic anemia  -Variably cellular bone marrow with trilineage hematopoiesis and a small monoclonal B-cell population detected by flow cytometry.  See comment    Comment:  Flow cytometry shows small kappa light chain restricted B-cell population (0.6% of total cells).  No lymphoid infiltrate is identified in the clot section or core biopsy.  The main differential diagnosis based on flow cytometry immunophenotype includes, but is not limited to, a monoclonal B-cell lymphocytosis versus marginal zone or lymphoplasmacytic  lymphoma.  Correlation with the clinical findings to determine if there is any evidence of lymphoma outside the bone marrow is recommended.  No definitive dyspoiesis is identified.  Conventional cytogenetics also showed normal female karyotype.    Light microscopic description  Peripheral blood  The peripheral blood shows mild leukopenia.  There is normocytic anemia with mild anisopoikilocytosis.  The platelets are normal in size number and granularity.  No definitive dyspoiesis is identified.  There is no evidence of blasts.  See linked CBC.  Bone marrow aspirate:  The aspirate smears do not show any cellular particles present for evaluation.  There are rare few megakaryocytes present without definitive dyspoiesis.  Only scattered maturing erythroid precursors identified.  The granulocytes present are predominantly mature.  No definitive dyspoiesis identified.  An iron stain on the aspirate smear shows 1 small cellular particle with abundant iron present.  No ring sideroblasts are identified.  Bone marrow clot section  The clot section shows a few scattered cellular particles with the approximate cellularity of 40 to 50%.  The megakaryocytes appear adequate in number with unremarkable morphology the granulocytic and erythroid precursors show progressive maturation with unremarkable morphology.  An iron stain in the clot section shows many hemosiderin laden macrophages present.  No ring sideroblasts are identified.  A pancytokeratin stain does not show any evidence of metastatic carcinoma.  A CD34 immunostain does not show any evidence of increased blasts.  The blasts account for less than 3% of the bone marrow cells.  CD3 and CD20 immunostain show few scattered T and B cells.  No lymphoid aggregates are identified.    Bone marrow core biopsy:  The core biopsy shows multiple small cores of bony trabeculae and bone marrow.  Focally there is marked aspiration artifact present.  The bone marrow has variable cellularity  ranging from 5 to 40%.  It is difficult given overall cellularity due to the aspiration artifact.  The megakaryocytes appear adequate in number with unremarkable morphology.  There is maturing erythroid and granulocytic cells with unremarkable morphology.  Immunostains are ordered to further evaluate the bone marrow.  A pancytokeratin stain does not show any evidence of metastatic carcinoma.  CD34 stain does not show any evidence of increased blast.  The blasts account for less than 3% of the bone marrow elements.  A CD3 and CD20 immunostains show a few scattered T and B cells.  No lymphoid aggregates are identified.  The touch imprints show similar findings to the core biopsy.    Flow cytometry immunophenotyping results  Flow cytometry performed at [facility] shows a monoclonal B-cell population.  The monoclonal B cells (0.6% of total cells) are without detectable CD5, CD10, or CD11c expression.  The monoclonal B cells show kappa light chain restriction.  Please see the outside report for additional information.    Cytogenetics/FISH studies  Conventional cytogenetics at [facility] showed normal female karyotype.  Please see outside report for additional information        Pertinent images/reports reviewed.  No orders to display     PET CT skull base to thigh 1/2022    FINDINGS:   HEAD/NECK: Bilateral level 2 B and level 5 FDG avid lymph nodes, maximal SUV left level 2 B 3.2, maximal SUV left level 5 2.9.   LUNGS: Normal.  No pathologic FDG activity.  No suspicious nodules on CT imaging.     MEDIASTINUM/HEBER: Normal.  No pathologic FDG activity.     CHEST WALL/AXILLA: FDG avid bilateral axillary lymph nodes, maximal SUV on the right 6.2,   ABDOMEN: Hepatic uptake of 2.7 maximal SUV.  No pathologic FDG activity.     PELVIS: Mildly prominent bilateral inguinal lymph nodes, maximal SUV on the right 3.2.  No intrapelvic mass, adenopathy or abnormal uptake of radioisotope   BONES: Normal.  No  pathologic FDG activity.  No suspicious lesions on CT imaging.     OTHER: Negative.         MRI brain w/wo contrast 10/12/2021    FINDINGS:   CEREBRUM:No edema, hemorrhage, mass, acute infarction, or inappropriate atrophy.     CEREBELLUM:No edema, hemorrhage, mass, acute infarction, or inappropriate atrophy.     BRAINSTEM:No edema, hemorrhage, mass, acute infarction, or inappropriate atrophy.     CSF SPACES:Ventricles, cisterns, and sulci are appropriate for age.  No hydrocephalus, subarachnoid hemorrhage, or mass.     SKULL:No mass or other significant visible lesion.     SINUSES:Small mucous retention cyst right maxillary sinus.  Minimal mucoperiosteal thickening ethmoidal air cells.   ORBITS:Limited views are unremarkable.     OTHER:No abnormal meningeal or parenchymal enhancement.       Dictated by: Carlyle Du M.D. on 10/12/2021 at 10:00       Electronically Signed By: Carlyle Du M.D. on 10/12/2021 at 10:05      IMPRESSION   CONCLUSION:   1. No MR evidence for acute intracranial abnormality   2. Mild chronic sinus disease   3. No intracranial metastatic disease    Echo 10/11/21  Study Conclusions     - Left ventricle: The cavity size is normal. Wall thickness is     normal. Systolic function was normal. The ejection fraction was     62%, by 3D calculation. Wall motion was normal; there were no     regional wall motion abnormalities. Left ventricular diastolic     function parameters were normal.   - Right ventricle: The cavity size is normal. Systolic function is     normal. The 3D ejection fraction is 47%, with normal free-wall     strain -22.3% The estimated peak pressure was 18mm Hg.   - No significant valvular abnormality.     CT head w/o contrast 11/2021    FINDINGS:   CEREBRUM:No edema, hemorrhage, mass, acute infarction, or inappropriate atrophy.     CEREBELLUM:No edema, hemorrhage, mass, acute infarction, or inappropriate atrophy.     BRAINSTEM:No edema, hemorrhage, mass, acute infarction, or  inappropriate atrophy.     CSF SPACES:Ventricles, cisterns, and sulci are appropriate for age.  No hydrocephalus, subarachnoid hemorrhage, or mass.     SKULL:No mass or other significant visible lesion.     SINUSES:Limited views demonstrate no significant mucosal thickening or fluid.     ORBITS:Limited views are unremarkable.     OTHER:Negative.       Dictated by: Ramakrishna Thao M.D. on 11/07/2021 at 14:34       Electronically Signed By: Ramakrishna Thao M.D. on 11/07/2021 at 14:36         IMPRESSION   CONCLUSION:     No acute intracranial abnormality by noncontrast CT.        CT ANGIO CHEST PE PROTOCOL  Result Date: 10/9/2021  CONCLUSION: 1. Small peripheral intraluminal filling defect is noted involving the right superior segmental lobar pulmonary artery. No additional filling defects are identified. There are no filling defects in the main right or left pulmonary artery. Findings would be consistent with low burden of acute to subacute pulmonary embolism 2. Soft tissue mass in the anterior superior mediastinum with right axillary enlarged lymph node. Diagnostic considerations include lymphoproliferative process such as lymphoma. The possibility of mediastinal thymoma is also a consideration with unrelated right axillary enlarged lymph node 3. Mediastinum and thanh are otherwise unremarkable 4. No pulmonary nodule or mass. 5. Probable hemangioma in the liver which can be confirmed non emergently with MRI per hemangioma protocol This report has been created by using the [employer] voice recognition system. Errors in spelling may exist and/or errors in content may exist which may be related to improper recognition by the system. Efforts to review and correct the report have been made, but irregularities may still exist.     CT ABDOMEN PELVIS W & W/ O IV CONTRAST  Result Date: 10/10/2021  CONCLUSION: 1. Hepatic hemangiomata. Liver is otherwise normal 2. Smaller enhancing structure near the dome of the liver left hepatic  lobe may represent hemangioma as well. Interval six-month follow-up recommended 3. Remainder of the exam is within normal limits This report has been created by using the [employer] voice recognition system. Errors in spelling may exist and/or errors in content may exist which may be related to improper recognition by the system. Efforts to review and correct the report have been made, but irregularities may still exist.    X-RAY FOOT >= 3 VIEWS RIGHT  Result Date: 9/28/2021  CONCLUSION:Normal radiographs.       X-RAY CHEST SINGLE VIEW  Result Date: 10/9/2021  CONCLUSION:Normal examination for a patient of this age.     US LOWER EXTREMITY PERIPHERAL DOPPLER BILATERAL  Result Date: 10/9/2021  CONCLUSION: 1. NEGATIVE RIGHT LEG DOPPLER SONOGRAM. THERE IS NO EVIDENCE OF DEEP VEIN THROMBOSIS. 2. NEGATIVE LEFT LEG DOPPLER SONOGRAM. THERE IS NO EVIDENCE OF DEEP VEIN THROMBOSIS. 3. Superficial occlusive thrombus in the left lesser saphenous vein from its junction through the mid posterior calf          IMPRESSION:  1. + JAYLYN, dsDNA, pANCA (neg MPO/PR3), U1RNP, and SSA. C/w SLE vs MCTD  2. 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 Follows with dermatology  3. Raynauds phenonmenon  4. Subsegmental PE - unprovoked. 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 Lymphadenopathy - resolved, negative axillary LN biopsy  6. Cytopenias - ongoing  7. Headaches - resolved  8. HTN      PLAN:  1. Blood work today - C3, c3, u/a, u pr:cr, dsDNA  2. Conservative management for Raynauds discussed  3. Will need annual visit to ophthalmology. Recently done, will request records.   4. Will continue hcq.       Patient will be seeking rheumatology closer to home.     A total of 25 minutes was spent today on the date of service and preparing to see patient, obtaining and reviewing separate the obtained history, performing medically appropriate examination, counseling and educating the patient, ordering medications and test, referring and communicating with  other HCP's, documenting clinical information in the EHR, independently interpreting results and communicating results to patient/family/caregiver and care coordination with patient.      Belem Kruse,   Rheumatology

## 2022-07-19 LAB — BACTERIA UR CULT: NORMAL

## 2022-07-20 ENCOUNTER — APPOINTMENT (OUTPATIENT)
Dept: CT IMAGING | Facility: IMAGING CENTER | Age: 67
End: 2022-07-20

## 2022-07-20 ENCOUNTER — OUTPATIENT (OUTPATIENT)
Dept: OUTPATIENT SERVICES | Facility: HOSPITAL | Age: 67
LOS: 1 days | End: 2022-07-20
Payer: MEDICARE

## 2022-07-20 DIAGNOSIS — N28.89 OTHER SPECIFIED DISORDERS OF KIDNEY AND URETER: ICD-10-CM

## 2022-07-20 DIAGNOSIS — Z98.89 OTHER SPECIFIED POSTPROCEDURAL STATES: Chronic | ICD-10-CM

## 2022-07-20 PROCEDURE — 74178 CT ABD&PLV WO CNTR FLWD CNTR: CPT | Mod: 26

## 2022-07-20 PROCEDURE — 82565 ASSAY OF CREATININE: CPT

## 2022-07-20 PROCEDURE — 74178 CT ABD&PLV WO CNTR FLWD CNTR: CPT

## 2022-07-25 ENCOUNTER — RX RENEWAL (OUTPATIENT)
Age: 67
End: 2022-07-25

## 2022-08-17 ENCOUNTER — APPOINTMENT (OUTPATIENT)
Dept: UROLOGY | Facility: CLINIC | Age: 67
End: 2022-08-17

## 2022-08-17 VITALS
TEMPERATURE: 98.3 F | OXYGEN SATURATION: 97 % | HEART RATE: 85 BPM | BODY MASS INDEX: 26.43 KG/M2 | DIASTOLIC BLOOD PRESSURE: 77 MMHG | WEIGHT: 154 LBS | SYSTOLIC BLOOD PRESSURE: 135 MMHG

## 2022-08-17 PROCEDURE — 99214 OFFICE O/P EST MOD 30 MIN: CPT

## 2022-08-17 NOTE — HISTORY OF PRESENT ILLNESS
[FreeTextEntry1] : 07/13/2022: Mr. HUNT is a 67 year male  is here for Ca Prostate and ED.\par \par Ca Prostate: diagnosed in 2007. Biopsy [place] Score 6, PSA 3.4 ng/ml, treated with I 125  Seed implant.\par Has increased frequency in the night X 4, most likely secondary to radiation.\par On Tamsulosin. No significant residual urine. PSA: 6/15/22:  0.01 ng/mL   \par \par Mr. Hunt is having back pain. 2011 he had 2 cysts in the kidney. and an inderminate 1.5 cm right renal mass, no change as of last CT 2021. Repeat CT scan requested.\par \par Mr. Hunt is stating his urinary stream and flow is slow. Patient is voiding and emptying bladder completely.  PVR: 0. N x4-  5.  Day time : q 4 h. On Tamsulosin.\par \par ED: has poor erections and was given Sildenafil and is not helping. He has soft erections, but will not last. Mr. Hunt was educated how to use the medication properly, and medicine renewed.\par \par RTC: one month to check CT scan. \par \par \par 08/17/2022: Mr. HNUT is a 67 year male who presents today for a follow up for Ca Prostate, Right Renal Mass, Nocturia, and ED\par \par Ca Prostate:  Diagnosed in 2007. Biopsy Car[place] Score 6, PSA 3.4 ng/ml, treated with I 125  Seed implant. No lymph nodes found in CT scan done on 07/20/22. Stable.\par \par Right Renal Mass: 1.2 cm septated cystic lesion of upper right kidney. No changes since 2016. On sonogram: cystic. Renal cyst. Unlikely to be malignant. \par \par Nocturia: Most likely secondary to radiation.  Pt is urinating q 2 hrs at night time and q 2-3 hrs during the day. His stream is slow. Taking Tamsulosin. \par \par ED: Mr. Hunt has poor erections and is taking Sildenafil 100 mg. It is working for him and he will take it PRN \par \par RTC: 1 year Follow up : UA, Culture, Uro -flow, and PVR

## 2022-08-17 NOTE — REVIEW OF SYSTEMS
[Recent Weight Loss (___ Lbs)] : recent [unfilled] ~Ulb weight loss [see HPI] : see HPI [Slow urine stream] : slow urine stream [Negative] : Heme/Lymph

## 2022-10-26 ENCOUNTER — APPOINTMENT (OUTPATIENT)
Dept: FAMILY MEDICINE | Facility: CLINIC | Age: 67
End: 2022-10-26

## 2022-10-26 VITALS
HEIGHT: 64 IN | HEART RATE: 102 BPM | SYSTOLIC BLOOD PRESSURE: 123 MMHG | DIASTOLIC BLOOD PRESSURE: 70 MMHG | OXYGEN SATURATION: 96 % | WEIGHT: 150 LBS | BODY MASS INDEX: 25.61 KG/M2

## 2022-10-26 DIAGNOSIS — H92.09 OTALGIA, UNSPECIFIED EAR: ICD-10-CM

## 2022-10-26 DIAGNOSIS — H61.23 IMPACTED CERUMEN, BILATERAL: ICD-10-CM

## 2022-10-26 PROCEDURE — 69209 REMOVE IMPACTED EAR WAX UNI: CPT | Mod: 50

## 2022-10-26 PROCEDURE — 99213 OFFICE O/P EST LOW 20 MIN: CPT | Mod: 25

## 2022-10-26 NOTE — PHYSICAL EXAM
[No Acute Distress] : no acute distress [Normal Sclera/Conjunctiva] : normal sclera/conjunctiva [EOMI] : extraocular movements intact [No JVD] : no jugular venous distention [No Respiratory Distress] : no respiratory distress  [No Accessory Muscle Use] : no accessory muscle use [Coordination Grossly Intact] : coordination grossly intact [Normal Affect] : the affect was normal [Alert and Oriented x3] : oriented to person, place, and time [Normal Insight/Judgement] : insight and judgment were intact [de-identified] : +b/l impacted cerumen

## 2022-11-09 ENCOUNTER — APPOINTMENT (OUTPATIENT)
Dept: INTERNAL MEDICINE | Facility: CLINIC | Age: 67
End: 2022-11-09

## 2022-11-09 DIAGNOSIS — R73.03 PREDIABETES.: ICD-10-CM

## 2022-11-09 DIAGNOSIS — J20.8 ACUTE BRONCHITIS DUE TO OTHER SPECIFIED ORGANISMS: ICD-10-CM

## 2022-11-09 PROCEDURE — 99204 OFFICE O/P NEW MOD 45 MIN: CPT | Mod: 95

## 2022-11-09 NOTE — ASSESSMENT
[FreeTextEntry1] : #Acute Bronchitis\par - zpack as directed\par - benzonatate pearls prn\par - flonase as directed - pt has at home\par Tylenol or Ibuprofen is recommended for treatment of fevers, chills, body-aches\par Over the counter decongestants and cough suppressants for mild nasal congestion, rhinorrhea and cough\par Lozenges for sore throat\par Keep well hydrated\par Rest\par \par #HL\par Continue current management\par Compliant w/ medication\par No side effects\par Follow low cholesterol diet\par Discussed healthy diet and lifestyle.\par Avoid tobacco. \par Get more exercise. Try to get about 150 minutes of physical activity every week, or about 30 minutes per day for most days of the week.\par Keep a healthy weight. Losing even 10% of your body weight makes a difference in your cholesterol levels.\par Reduce the effect of negative emotions, Learn healthy ways to deal with anger, stress or other negative emotions.\par Replace saturated fat and trans fat with healthier fats.\par Bad Fats - margarine, french fries, doughnuts, cookies, pastries, crackers, processed meats, canola oil and hydrogenated oils\par Good Fats/Unsaturated fats - avocados, eggs (with out the yellow), coconut oil, Raw Nuts, [place], Flaxseed, Leafy green veg, organic butter, organic nut \par Eat heart-healthy foods like fruits, vegetables, poultry, fish and whole grains. Limit red meat, sugary products and dairy products made with whole milk.\par \par #Prediabetes\par Discussed importance of lifestyle modifications including diabetic diet including following a carbohydrate balanced diet, healthy plating, portion control, and minimize snacking. and the importance of incorporating protein in meals. We also discussed appropriate alternative food choices including sugar alternatives. We discussed the importance of incorporating exercise, to increase physical activity at least 30min/day and discussed the importance of weight loss in the management of her comorbidities. We discussed the risks of continued excess weight on long term health.\par \par f/u in 1 week if no improvement\par take home covid test if sx get worse\par \par \par pt agrees and understands plan via teach back method. all questions answered.\par \par \par \par \par \par \par \par

## 2022-11-09 NOTE — HEALTH RISK ASSESSMENT
[No] : No [No falls in past year] : Patient reported no falls in the past year [0] : 2) Feeling down, depressed, or hopeless: Not at all (0) [[id number]] : 0

## 2022-11-09 NOTE — REVIEW OF SYSTEMS
[Fatigue] : fatigue [Nasal Discharge] : nasal discharge [Sore Throat] : sore throat [Cough] : cough [Negative] : Psychiatric [Earache] : no earache [Hearing Loss] : no hearing loss [Nosebleeds] : no nosebleeds [Postnasal Drip] : no postnasal drip [Hoarseness] : no hoarseness [FreeTextEntry6] : productive cough yellow/green mucus - coughing during visit.

## 2022-11-09 NOTE — HISTORY OF PRESENT ILLNESS
[Home] : at home, [unfilled] , at the time of the visit. [Medical Office: ([facility])___] : at the medical office located in  [Verbal consent obtained from patient] : the patient, [unfilled] [FreeTextEntry8] : 67 year old male\par presents with 3 days  of URI symptoms including:\par Rhinitis, productive cough, congestion, throat irritation\par Pt allergic to penicllin reports sore throat 9/10\par no dysphagia or drooling, or diffiuclty speaking or drinking fluids.\par Denies fever, bodyaches earache\par Denies SOB or wheezing or CP or palpitations\par Denies NVD\par Denies HA\par Pt denies any recent sick contacts or recent travel\par Pt did not home covid test.\par Pt had omicron shot in Sept Bivalent\par Pt had flu shot - sept 2022\par Pt taking advil cold and sinus for sx.\par

## 2022-11-09 NOTE — PHYSICAL EXAM
[No Acute Distress] : no acute distress [Normal Voice/Communication] : normal voice/communication [de-identified] : pt able to communicate without any difficulty during tele visit. [de-identified] : wet cough during encounter with mucus

## 2023-02-01 ENCOUNTER — RX RENEWAL (OUTPATIENT)
Age: 68
End: 2023-02-01

## 2023-02-28 ENCOUNTER — RX RENEWAL (OUTPATIENT)
Age: 68
End: 2023-02-28

## 2023-03-17 ENCOUNTER — RX RENEWAL (OUTPATIENT)
Age: 68
End: 2023-03-17

## 2023-05-23 ENCOUNTER — APPOINTMENT (OUTPATIENT)
Dept: FAMILY MEDICINE | Facility: CLINIC | Age: 68
End: 2023-05-23
Payer: MEDICARE

## 2023-05-23 VITALS
DIASTOLIC BLOOD PRESSURE: 80 MMHG | SYSTOLIC BLOOD PRESSURE: 132 MMHG | BODY MASS INDEX: 25.95 KG/M2 | HEART RATE: 67 BPM | WEIGHT: 152 LBS | OXYGEN SATURATION: 95 % | HEIGHT: 64 IN

## 2023-05-23 DIAGNOSIS — M54.50 LOW BACK PAIN, UNSPECIFIED: ICD-10-CM

## 2023-05-23 PROCEDURE — G0439: CPT

## 2023-05-23 PROCEDURE — 36415 COLL VENOUS BLD VENIPUNCTURE: CPT

## 2023-05-23 RX ORDER — AZITHROMYCIN 250 MG/1
250 TABLET, FILM COATED ORAL
Qty: 1 | Refills: 0 | Status: DISCONTINUED | COMMUNITY
Start: 2022-11-09 | End: 2023-05-23

## 2023-05-23 RX ORDER — BENZONATATE 200 MG/1
200 CAPSULE ORAL
Qty: 1 | Refills: 0 | Status: DISCONTINUED | COMMUNITY
Start: 2022-11-09 | End: 2023-05-23

## 2023-05-24 LAB
25(OH)D3 SERPL-MCNC: 37.9 NG/ML
ALBUMIN SERPL ELPH-MCNC: 4.3 G/DL
ALP BLD-CCNC: 80 U/L
ALT SERPL-CCNC: 20 U/L
ANION GAP SERPL CALC-SCNC: 12 MMOL/L
APPEARANCE: CLEAR
AST SERPL-CCNC: 17 U/L
BACTERIA: NEGATIVE /HPF
BILIRUB SERPL-MCNC: 0.4 MG/DL
BILIRUBIN URINE: NEGATIVE
BLOOD URINE: NEGATIVE
BUN SERPL-MCNC: 17 MG/DL
CALCIUM SERPL-MCNC: 9.8 MG/DL
CAST: 0 /LPF
CHLORIDE SERPL-SCNC: 104 MMOL/L
CHOLEST SERPL-MCNC: 186 MG/DL
CO2 SERPL-SCNC: 23 MMOL/L
COLOR: YELLOW
CREAT SERPL-MCNC: 1 MG/DL
EGFR: 82 ML/MIN/1.73M2
EPITHELIAL CELLS: 0 /HPF
ESTIMATED AVERAGE GLUCOSE: 126 MG/DL
GLUCOSE QUALITATIVE U: NEGATIVE MG/DL
GLUCOSE SERPL-MCNC: 107 MG/DL
HBA1C MFR BLD HPLC: 6 %
HDLC SERPL-MCNC: 47 MG/DL
KETONES URINE: NEGATIVE MG/DL
LDLC SERPL CALC-MCNC: 109 MG/DL
LEUKOCYTE ESTERASE URINE: NEGATIVE
MICROSCOPIC-UA: NORMAL
NITRITE URINE: NEGATIVE
NONHDLC SERPL-MCNC: 139 MG/DL
PH URINE: 6
POTASSIUM SERPL-SCNC: 4.3 MMOL/L
PROT SERPL-MCNC: 6.8 G/DL
PROTEIN URINE: NEGATIVE MG/DL
PSA SERPL-MCNC: <0.01 NG/ML
RED BLOOD CELLS URINE: 0 /HPF
SODIUM SERPL-SCNC: 139 MMOL/L
SPECIFIC GRAVITY URINE: 1.01
TRIGL SERPL-MCNC: 151 MG/DL
TSH SERPL-ACNC: 3.34 UIU/ML
UROBILINOGEN URINE: 0.2 MG/DL
WHITE BLOOD CELLS URINE: 0 /HPF

## 2023-08-16 ENCOUNTER — APPOINTMENT (OUTPATIENT)
Dept: UROLOGY | Facility: CLINIC | Age: 68
End: 2023-08-16
Payer: MEDICARE

## 2023-08-16 VITALS
SYSTOLIC BLOOD PRESSURE: 123 MMHG | WEIGHT: 149 LBS | HEART RATE: 70 BPM | BODY MASS INDEX: 25.44 KG/M2 | RESPIRATION RATE: 16 BRPM | OXYGEN SATURATION: 97 % | HEIGHT: 64 IN | TEMPERATURE: 98.2 F | DIASTOLIC BLOOD PRESSURE: 77 MMHG

## 2023-08-16 DIAGNOSIS — Z87.448 PERSONAL HISTORY OF OTHER DISEASES OF URINARY SYSTEM: ICD-10-CM

## 2023-08-16 PROCEDURE — 99214 OFFICE O/P EST MOD 30 MIN: CPT

## 2023-08-16 PROCEDURE — 51741 ELECTRO-UROFLOWMETRY FIRST: CPT

## 2023-08-16 PROCEDURE — 76775 US EXAM ABDO BACK WALL LIM: CPT

## 2023-08-16 NOTE — HISTORY OF PRESENT ILLNESS
[FreeTextEntry1] : 07/13/2022: Mr. HUNT is a 67 year male  is here for Ca Prostate and ED.  Ca Prostate: diagnosed in 2007. Biopsy Caridad Score 6, PSA 3.4 ng/ml, treated with I 125  Seed implant. Has increased frequency in the night X 4, most likely secondary to radiation. On Tamsulosin. No significant residual urine. PSA: 6/15/22:  0.01 ng/mL     Mr. Hunt is having back pain. 2011 he had 2 cysts in the kidney. and an inderminate 1.5 cm right renal mass, no change as of last CT 2021. Repeat CT scan requested.  Mr. Hunt is stating his urinary stream and flow is slow. Patient is voiding and emptying bladder completely.  PVR: 0. N x4-  5.  Day time : q 4 h. On Tamsulosin.  ED: has poor erections and was given Sildenafil and is not helping. He has soft erections, but will not last. Mr. Hunt was educated how to use the medication properly, and medicine renewed.  RTC: one month to check CT scan.    08/17/2022: Mr. HUNT is a 67 year male who presents today for a follow up for Ca Prostate, Right Renal Mass, Nocturia, and ED  Ca Prostate:  Diagnosed in 2007. Biopsy [place] Score 6, PSA 3.4 ng/ml, treated with I 125  Seed implant. No lymph nodes found in CT scan done on 07/20/22. Stable.  Right Renal Mass: 1.2 cm septated cystic lesion of upper right kidney. No changes since 2016. On sonogram: cystic. Renal cyst. Unlikely to be malignant.   Nocturia: Most likely secondary to radiation.  Pt is urinating q 2 hrs at night time and q 2-3 hrs during the day. His stream is slow. Taking Tamsulosin.   ED: Mr. Hunt has poor erections and is taking Sildenafil 100 mg. It is working for him and he will take it PRN   RTC: 1 year Follow up : UA, Culture, Uro -flow, and PVR      08/16/2023: Mr. HUNT is a 68 year male who presents today for a follow up for Ca Prostate, Right Renal Mass, and ED Complaining of slow urine and increased frequency of urination, already on Tamsulosin. Uroflow is slow, PVR 57 ml. S/P brachytherapy 2011. Will schedule for cystoscopy to r/o urethral stricture and evaluate prostatic fossa.   Ca Prostate: Developed cancer at the age of 52. Diagnosed in 2007. Biopsy Caridad Score 6, PSA 3.4 ng/ml Treated with I 125 Seed implant.  PSA: 03/08/18: 0.02 ng/mL 01/30/19: 0.01 ng/mL 07/02/20: <0.01 ng/mL 08/10/20: <0.01 ng/mL 07/08/21: <0.01 ng/mL 06/15/22: <0.01 ng/mL 05/23/23: <0.01 ng/mL PSA stable    Enlarged prostate:  C/O slow flow and difficulty in urination PVR: 57 cc  Denies hematuria, dysuria, and urgency  Recommended getting cystoscopy to r/o stricture.  The procedure, alternatives, benefits and risks were explained to the pt.  He understands and requests to proceed with the cystoscopy.  On Tamsulosin. Renewed today and will continue    Right Renal Mass:  Last renal sonogram done on: 8/17/22: 1.2 cm cystic lesion of upper right kidney. No changes since 2016. On sonogram: cystic. Renal cyst. Unlikely to be malignant.  renal sonogram today:  Findings: Bilateral multiple simple cysts visualized with no flow. Both kidneys are normal in size without stones or hydronephrosis  ED: Sildenafil 100 mg PO PRN daily. Working well.  Renewed today and will continue   RTC: 2 weeks for Cystoscopy with possible biopsy and fulguration to r/o stricture

## 2023-08-16 NOTE — PHYSICAL EXAM
[General Appearance - Well Developed] : well developed [General Appearance - Well Nourished] : well nourished [Normal Appearance] : normal appearance [Well Groomed] : well groomed [General Appearance - In No Acute Distress] : no acute distress [Abdomen Soft] : soft [Abdomen Tenderness] : non-tender [Costovertebral Angle Tenderness] : no ~M costovertebral angle tenderness [Urethral Meatus] : meatus normal [Urinary Bladder Findings] : the bladder was normal on palpation [Scrotum] : the scrotum was normal [Epididymis] : the epididymides were normal [Testes Tenderness] : no tenderness of the testes [Testes Mass (___cm)] : there were no testicular masses [Edema] : no peripheral edema [] : no respiratory distress [Respiration, Rhythm And Depth] : normal respiratory rhythm and effort [Exaggerated Use Of Accessory Muscles For Inspiration] : no accessory muscle use [Oriented To Time, Place, And Person] : oriented to person, place, and time [Affect] : the affect was normal [Mood] : the mood was normal [Not Anxious] : not anxious [Normal Station and Gait] : the gait and station were normal for the patient's age [No Focal Deficits] : no focal deficits [No Palpable Adenopathy] : no palpable adenopathy [Penis Abnormality] : normal circumcised penis

## 2023-08-21 ENCOUNTER — NON-APPOINTMENT (OUTPATIENT)
Age: 68
End: 2023-08-21

## 2023-08-22 ENCOUNTER — NON-APPOINTMENT (OUTPATIENT)
Age: 68
End: 2023-08-22

## 2023-08-22 ENCOUNTER — APPOINTMENT (OUTPATIENT)
Dept: FAMILY MEDICINE | Facility: CLINIC | Age: 68
End: 2023-08-22
Payer: MEDICARE

## 2023-08-22 VITALS
HEIGHT: 64 IN | SYSTOLIC BLOOD PRESSURE: 137 MMHG | BODY MASS INDEX: 25.44 KG/M2 | WEIGHT: 149 LBS | DIASTOLIC BLOOD PRESSURE: 87 MMHG | HEART RATE: 78 BPM | OXYGEN SATURATION: 94 %

## 2023-08-22 PROCEDURE — 93000 ELECTROCARDIOGRAM COMPLETE: CPT

## 2023-08-22 PROCEDURE — 99213 OFFICE O/P EST LOW 20 MIN: CPT | Mod: 25

## 2023-08-22 NOTE — PHYSICAL EXAM
[Soft] : abdomen soft [Non Tender] : non-tender [Non-distended] : non-distended [No Masses] : no abdominal mass palpated [No HSM] : no HSM [Normal Bowel Sounds] : normal bowel sounds [Normal] : soft, non-tender, non-distended, no masses palpated, no HSM and normal bowel sounds [Normal Insight/Judgement] : insight and judgment were intact

## 2023-08-22 NOTE — HISTORY OF PRESENT ILLNESS
[FreeTextEntry8] : 67 yo M PMHx HTN presents with ongoing chest pain for about 2 days. Patient reports the symptoms started on Friday. The pain is located on his upper abdomen. describes it as achy and dull, constant. no exacerbating factors. relieved with Tylenol. he called triage center who rec urgent care or ER to be evaluated. he felt dizzy while driving yesterday so he went the urgent care last night who performed an EKG. he reports EKG was normal however his blood pressure was high about 169/78 and was rec to go to the ER. He went home instead and is still having the symptoms. he notes that he had achar chutney last week which was sour and spicy. Denies worsening pain or sob with exertion. denies prior cardiac history.

## 2023-08-22 NOTE — PLAN
[FreeTextEntry1] : 1. Epigastric pain  - patient reports pain is located upper abdomen suspect likely 2/2 to reflux  - EKG: NSR no acute ST elevations or depressions  - start omeprazole x 30 days  - advised to go to the ER for evaluation if worsening symptoms   2. Abnormal EKG - meets criteria for LVH  - referred to cardio for further workup

## 2023-08-22 NOTE — REVIEW OF SYSTEMS
[Chest Pain] : chest pain [Abdominal Pain] : abdominal pain [Nausea] : nausea [Negative] : Heme/Lymph [Vomiting] : no vomiting

## 2023-08-31 ENCOUNTER — APPOINTMENT (OUTPATIENT)
Dept: UROLOGY | Facility: CLINIC | Age: 68
End: 2023-08-31
Payer: MEDICARE

## 2023-08-31 VITALS
BODY MASS INDEX: 25.23 KG/M2 | SYSTOLIC BLOOD PRESSURE: 144 MMHG | DIASTOLIC BLOOD PRESSURE: 88 MMHG | OXYGEN SATURATION: 97 % | HEART RATE: 65 BPM | TEMPERATURE: 98 F | WEIGHT: 147 LBS

## 2023-08-31 DIAGNOSIS — N28.89 OTHER SPECIFIED DISORDERS OF KIDNEY AND URETER: ICD-10-CM

## 2023-08-31 PROCEDURE — 52281 CYSTOSCOPY AND TREATMENT: CPT

## 2023-08-31 PROCEDURE — 99213 OFFICE O/P EST LOW 20 MIN: CPT | Mod: 25

## 2023-08-31 NOTE — PHYSICAL EXAM
[General Appearance - Well Developed] : well developed [General Appearance - Well Nourished] : well nourished [Normal Appearance] : normal appearance [Well Groomed] : well groomed [General Appearance - In No Acute Distress] : no acute distress [Abdomen Soft] : soft [Abdomen Tenderness] : non-tender [Costovertebral Angle Tenderness] : no ~M costovertebral angle tenderness [Urethral Meatus] : meatus normal [Urinary Bladder Findings] : the bladder was normal on palpation [Scrotum] : the scrotum was normal [Epididymis] : the epididymides were normal [Testes Tenderness] : no tenderness of the testes [Testes Mass (___cm)] : there were no testicular masses [Edema] : no peripheral edema [] : no respiratory distress [Respiration, Rhythm And Depth] : normal respiratory rhythm and effort [Exaggerated Use Of Accessory Muscles For Inspiration] : no accessory muscle use [Oriented To Time, Place, And Person] : oriented to person, place, and time [Affect] : the affect was normal [Mood] : the mood was normal [Not Anxious] : not anxious [Normal Station and Gait] : the gait and station were normal for the patient's age [No Focal Deficits] : no focal deficits [No Palpable Adenopathy] : no palpable adenopathy [Penis Abnormality] : normal uncircumcised penis

## 2023-08-31 NOTE — HISTORY OF PRESENT ILLNESS
[FreeTextEntry1] : 07/13/2022: Mr. HUNT is a 67 year male  is here for Ca Prostate and ED.  Ca Prostate: diagnosed in 2007. Biopsy Caridad Score 6, PSA 3.4 ng/ml, treated with I 125  Seed implant. Has increased frequency in the night X 4, most likely secondary to radiation. On Tamsulosin. No significant residual urine. PSA: 6/15/22:  0.01 ng/mL     Mr. Hunt is having back pain. 2011 he had 2 cysts in the kidney. and an inderminate 1.5 cm right renal mass, no change as of last CT 2021. Repeat CT scan requested.  Mr. Hunt is stating his urinary stream and flow is slow. Patient is voiding and emptying bladder completely.  PVR: 0. N x4-  5.  Day time : q 4 h. On Tamsulosin.  ED: has poor erections and was given Sildenafil and is not helping. He has soft erections, but will not last. Mr. Hunt was educated how to use the medication properly, and medicine renewed.  RTC: one month to check CT scan.    08/17/2022: Mr. HUNT is a 67 year male who presents today for a follow up for Ca Prostate, Right Renal Mass, Nocturia, and ED  Ca Prostate:  Diagnosed in 2007. Biopsy [place] Score 6, PSA 3.4 ng/ml, treated with I 125  Seed implant. No lymph nodes found in CT scan done on 07/20/22. Stable.  Right Renal Mass: 1.2 cm septated cystic lesion of upper right kidney. No changes since 2016. On sonogram: cystic. Renal cyst. Unlikely to be malignant.   Nocturia: Most likely secondary to radiation.  Pt is urinating q 2 hrs at night time and q 2-3 hrs during the day. His stream is slow. Taking Tamsulosin.   ED: Mr. Hunt has poor erections and is taking Sildenafil 100 mg. It is working for him and he will take it PRN   RTC: 1 year Follow up : UA, Culture, Uro -flow, and PVR      08/16/2023: Mr. HUNT is a 68 year male who presents today for a follow up for Ca Prostate, Right Renal Mass, and ED Complaining of slow urine and increased frequency of urination, already on Tamsulosin. Uroflow is slow, PVR 57 ml. S/P brachytherapy 2011. Will schedule for cystoscopy to r/o urethral stricture and evaluate prostatic fossa.   Ca Prostate: Developed cancer at the age of 52. Diagnosed in 2007. Biopsy Caridad Score 6, PSA 3.4 ng/ml Treated with I 125 Seed implant.  PSA: 03/08/18: 0.02 ng/mL 01/30/19: 0.01 ng/mL 07/02/20: <0.01 ng/mL 08/10/20: <0.01 ng/mL 07/08/21: <0.01 ng/mL 06/15/22: <0.01 ng/mL 05/23/23: <0.01 ng/mL PSA stable    Enlarged prostate:  C/O slow flow and difficulty in urination PVR: 57 cc  Denies hematuria, dysuria, and urgency  Recommended getting cystoscopy to r/o stricture.  The procedure, alternatives, benefits and risks were explained to the pt.  He understands and requests to proceed with the cystoscopy.  On Tamsulosin. Renewed today and will continue    Right Renal Mass:  Last renal sonogram done on: 8/17/22: 1.2 cm cystic lesion of upper right kidney. No changes since 2016. On sonogram: cystic. Renal cyst. Unlikely to be malignant.  renal sonogram today:  Findings: Bilateral multiple simple cysts visualized with no flow. Both kidneys are normal in size without stones or hydronephrosis  ED: Sildenafil 100 mg PO PRN daily. Working well.  Renewed today and will continue   RTC: 2 weeks for Cystoscopy with possible biopsy and fulguration to r/o stricture         08/31/2023: Mr. HUNT is a 68 year male who presents today for a follow up for frequent urination ,slow flow and difficulty in urination   cystoscopy today to r/o stricture.  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 Could not evaluate prostate or bladder. Will schedule for cysto, visual internal urethrotomy, poss TURP at [facility].   Ca Prostate: Developed cancer at the age of 52. Diagnosed in 2007. Biopsy Caridad Score 6, PSA 3.4 ng/ml Treated with I 125 Seed implant 01/08/2008.   PSA: 03/08/18: 0.02 ng/mL 01/30/19: 0.01 ng/mL 07/02/20: <0.01 ng/mL 08/10/20: <0.01 ng/mL 07/08/21: <0.01 ng/mL 06/15/22: <0.01 ng/mL 05/23/23: <0.01 ng/mL PSA stable    Right renal mass: renal sonogram 08/16/2023 Findings: Bilateral multiple simple cysts visualized with no flow.  Both kidneys are normal in size without stones or hydronephrosis No changes since 2016.   ED: Sildenafil 100 mg PO PRN daily. Working well.  Renewed today and will continue    Scheduled for VIU and p[oss TURP at [facility].

## 2023-09-07 ENCOUNTER — APPOINTMENT (OUTPATIENT)
Dept: CARDIOLOGY | Facility: CLINIC | Age: 68
End: 2023-09-07
Payer: MEDICARE

## 2023-09-07 VITALS
HEIGHT: 64 IN | HEART RATE: 66 BPM | BODY MASS INDEX: 25.27 KG/M2 | DIASTOLIC BLOOD PRESSURE: 84 MMHG | WEIGHT: 148 LBS | SYSTOLIC BLOOD PRESSURE: 136 MMHG | OXYGEN SATURATION: 96 %

## 2023-09-07 DIAGNOSIS — R07.9 CHEST PAIN, UNSPECIFIED: ICD-10-CM

## 2023-09-07 DIAGNOSIS — R94.31 ABNORMAL ELECTROCARDIOGRAM [ECG] [EKG]: ICD-10-CM

## 2023-09-07 PROCEDURE — 99204 OFFICE O/P NEW MOD 45 MIN: CPT

## 2023-09-15 ENCOUNTER — OUTPATIENT (OUTPATIENT)
Dept: OUTPATIENT SERVICES | Facility: HOSPITAL | Age: 68
LOS: 1 days | Discharge: HOME | End: 2023-09-15
Payer: MEDICARE

## 2023-09-15 VITALS
HEART RATE: 73 BPM | HEIGHT: 63 IN | TEMPERATURE: 98 F | SYSTOLIC BLOOD PRESSURE: 128 MMHG | RESPIRATION RATE: 18 BRPM | OXYGEN SATURATION: 97 % | WEIGHT: 153.44 LBS | DIASTOLIC BLOOD PRESSURE: 75 MMHG

## 2023-09-15 DIAGNOSIS — Z98.89 OTHER SPECIFIED POSTPROCEDURAL STATES: Chronic | ICD-10-CM

## 2023-09-15 DIAGNOSIS — N35.912 UNSPECIFIED BULBOUS URETHRAL STRICTURE, MALE: ICD-10-CM

## 2023-09-15 DIAGNOSIS — Z01.818 ENCOUNTER FOR OTHER PREPROCEDURAL EXAMINATION: ICD-10-CM

## 2023-09-15 DIAGNOSIS — N40.0 BENIGN PROSTATIC HYPERPLASIA WITHOUT LOWER URINARY TRACT SYMPTOMS: ICD-10-CM

## 2023-09-15 LAB
ANION GAP SERPL CALC-SCNC: 5 MMOL/L — SIGNIFICANT CHANGE UP (ref 5–17)
APPEARANCE UR: CLEAR — SIGNIFICANT CHANGE UP
BILIRUB UR-MCNC: NEGATIVE — SIGNIFICANT CHANGE UP
BUN SERPL-MCNC: 15 MG/DL — SIGNIFICANT CHANGE UP (ref 7–23)
CALCIUM SERPL-MCNC: 9 MG/DL — SIGNIFICANT CHANGE UP (ref 8.5–10.1)
CHLORIDE SERPL-SCNC: 111 MMOL/L — HIGH (ref 96–108)
CO2 SERPL-SCNC: 24 MMOL/L — SIGNIFICANT CHANGE UP (ref 22–31)
COLOR SPEC: YELLOW — SIGNIFICANT CHANGE UP
CREAT SERPL-MCNC: 1.1 MG/DL — SIGNIFICANT CHANGE UP (ref 0.5–1.3)
DIFF PNL FLD: NEGATIVE — SIGNIFICANT CHANGE UP
EGFR: 73 ML/MIN/1.73M2 — SIGNIFICANT CHANGE UP
GLUCOSE SERPL-MCNC: 114 MG/DL — HIGH (ref 70–99)
GLUCOSE UR QL: NEGATIVE MG/DL — SIGNIFICANT CHANGE UP
HCT VFR BLD CALC: 41.3 % — SIGNIFICANT CHANGE UP (ref 39–50)
HGB BLD-MCNC: 13.8 G/DL — SIGNIFICANT CHANGE UP (ref 13–17)
KETONES UR-MCNC: NEGATIVE — SIGNIFICANT CHANGE UP
LEUKOCYTE ESTERASE UR-ACNC: NEGATIVE — SIGNIFICANT CHANGE UP
MCHC RBC-ENTMCNC: 28.7 PG — SIGNIFICANT CHANGE UP (ref 27–34)
MCHC RBC-ENTMCNC: 33.4 G/DL — SIGNIFICANT CHANGE UP (ref 32–36)
MCV RBC AUTO: 85.9 FL — SIGNIFICANT CHANGE UP (ref 80–100)
NITRITE UR-MCNC: NEGATIVE — SIGNIFICANT CHANGE UP
NRBC # BLD: 0 /100 WBCS — SIGNIFICANT CHANGE UP (ref 0–0)
PH UR: 6 — SIGNIFICANT CHANGE UP (ref 5–8)
PLATELET # BLD AUTO: 186 K/UL — SIGNIFICANT CHANGE UP (ref 150–400)
POTASSIUM SERPL-MCNC: 4.3 MMOL/L — SIGNIFICANT CHANGE UP (ref 3.5–5.3)
POTASSIUM SERPL-SCNC: 4.3 MMOL/L — SIGNIFICANT CHANGE UP (ref 3.5–5.3)
PROT UR-MCNC: NEGATIVE MG/DL — SIGNIFICANT CHANGE UP
RBC # BLD: 4.81 M/UL — SIGNIFICANT CHANGE UP (ref 4.2–5.8)
RBC # FLD: 12.9 % — SIGNIFICANT CHANGE UP (ref 10.3–14.5)
SODIUM SERPL-SCNC: 140 MMOL/L — SIGNIFICANT CHANGE UP (ref 135–145)
SP GR SPEC: 1.01 — SIGNIFICANT CHANGE UP (ref 1.01–1.02)
UROBILINOGEN FLD QL: NEGATIVE MG/DL — SIGNIFICANT CHANGE UP
WBC # BLD: 5.75 K/UL — SIGNIFICANT CHANGE UP (ref 3.8–10.5)
WBC # FLD AUTO: 5.75 K/UL — SIGNIFICANT CHANGE UP (ref 3.8–10.5)

## 2023-09-15 PROCEDURE — 93010 ELECTROCARDIOGRAM REPORT: CPT

## 2023-09-15 NOTE — H&P PST ADULT - NSANTHOSAYNRD_GEN_A_CORE
No. PING screening performed.  STOP BANG Legend: 0-2 = LOW Risk; 3-4 = INTERMEDIATE Risk; 5-8 = HIGH Risk

## 2023-09-15 NOTE — H&P PST ADULT - HISTORY OF PRESENT ILLNESS
68M pmh prostate Cancer (s/p sx 2008), enlarged prostate, here for PST for scheduled Cystoscopy, visual internal urethrotomy possible transurethral resection of the prostate with Dr. Goncalves on 9-25-23  This patient denies any fever, cough, sob, flu like symptoms or travel outside of the [place] in the past 30 days

## 2023-09-15 NOTE — H&P PST ADULT - PROBLEM SELECTOR PLAN 2
cystoscopy, visual internal urethrotomy possible transurethral resection of the prostate  Pre-op instructions given by RN, patient verbalized understanding  Chlorhexidine wash instructions given   medical clearance  Anesthesiologist to review PST labs, EKG, required clearances and optimization for surgery.

## 2023-09-15 NOTE — H&P PST ADULT - ASSESSMENT
68M [facility] prostate Cancer (s/p sx ), enlarged prostate, here for PST for scheduled Cystoscopy, visual internal urethrotomy possible transurethral resection of the prostate with Dr. Goncalves on 23  CAPRINI SCORE    AGE RELATED RISK FACTORS                                                       MOBILITY RELATED FACTORS  [ ] Age 41-60 years                                            (1 Point)                  [ ] Bed rest                                                        (1 Point)  [x ] Age: 61-74 years                                           (2 Points)                [ ] Plaster cast                                                   (2 Points)  [ ] Age= 75 years                                              (3 Points)                 [ ] Bed bound for more than 72 hours                   (2 Points)    DISEASE RELATED RISK FACTORS                                               GENDER SPECIFIC FACTORS  [ ] Edema in the lower extremities                       (1 Point)                  [ ] Pregnancy                                                     (1 Point)  [ ] Varicose veins                                               (1 Point)                  [ ] Post-partum < 6 weeks                                   (1 Point)             [x ] BMI > 25 Kg/m2                                            (1 Point)                  [ ] Hormonal therapy  or oral contraception            (1 Point)                 [ ] Sepsis (in the previous month)                        (1 Point)                  [ ] History of pregnancy complications  [ ] Pneumonia or serious lung disease                                               [ ] Unexplained or recurrent                       (1 Point)           (in the previous month)                               (1 Point)  [ ] Abnormal pulmonary function test                     (1 Point)                 SURGERY RELATED RISK FACTORS  [ ] Acute myocardial infarction                              (1 Point)                 [ ]  Section                                            (1 Point)  [ ] Congestive heart failure (in the previous month)  (1 Point)                 [ ] Minor surgery                                                 (1 Point)   [ ] Inflammatory bowel disease                             (1 Point)                 [ ] Arthroscopic surgery                                        (2 Points)  [ ] Central venous access                                    (2 Points)                [ x] General surgery lasting more than 45 minutes   (2 Points)       [ ] Stroke (in the previous month)                          (5 Points)               [ ] Elective arthroplasty                                        (5 Points)                                                                                                                                               HEMATOLOGY RELATED FACTORS                                                 TRAUMA RELATED RISK FACTORS  [ ] Prior episodes of VTE                                     (3 Points)                 [ ] Fracture of the hip, pelvis, or leg                       (5 Points)  [ ] Positive family history for VTE                         (3 Points)                 [ ] Acute spinal cord injury (in the previous month)  (5 Points)  [ ] Prothrombin 57225 A                                      (3 Points)                 [ ] Paralysis  (less than 1 month)                          (5 Points)  [ ] Factor V Leiden                                             (3 Points)                 [ ] Multiple Trauma within 1 month                         (5 Points)  [ ] Lupus anticoagulants                                     (3 Points)                                                           [ ] Anticardiolipin antibodies                                (3 Points)                                                       [ ] High homocysteine in the blood                      (3 Points)                                             [ ] Other congenital or acquired thrombophilia       (3 Points)                                                [ ] Heparin induced thrombocytopenia                  (3 Points)                                          Total Score [    5      ]

## 2023-09-16 LAB
CULTURE RESULTS: NO GROWTH — SIGNIFICANT CHANGE UP
SPECIMEN SOURCE: SIGNIFICANT CHANGE UP

## 2023-09-19 ENCOUNTER — APPOINTMENT (OUTPATIENT)
Dept: FAMILY MEDICINE | Facility: CLINIC | Age: 68
End: 2023-09-19

## 2023-09-21 ENCOUNTER — APPOINTMENT (OUTPATIENT)
Dept: INTERNAL MEDICINE | Facility: CLINIC | Age: 68
End: 2023-09-21

## 2023-09-25 ENCOUNTER — APPOINTMENT (OUTPATIENT)
Dept: UROLOGY | Facility: HOSPITAL | Age: 68
End: 2023-09-25

## 2023-10-05 PROBLEM — C61 MALIGNANT NEOPLASM OF PROSTATE: Chronic | Status: ACTIVE | Noted: 2023-09-15

## 2023-10-24 PROBLEM — R94.31 ABNORMAL EKG: Status: ACTIVE | Noted: 2023-08-22

## 2023-10-24 PROBLEM — R07.9 CHEST PAIN: Status: ACTIVE | Noted: 2023-08-22

## 2023-11-02 ENCOUNTER — APPOINTMENT (OUTPATIENT)
Dept: INTERNAL MEDICINE | Facility: CLINIC | Age: 68
End: 2023-11-02
Payer: MEDICARE

## 2023-11-02 ENCOUNTER — APPOINTMENT (OUTPATIENT)
Dept: CARDIOLOGY | Facility: CLINIC | Age: 68
End: 2023-11-02
Payer: MEDICARE

## 2023-11-02 VITALS
HEIGHT: 64 IN | WEIGHT: 148 LBS | DIASTOLIC BLOOD PRESSURE: 78 MMHG | BODY MASS INDEX: 25.27 KG/M2 | HEART RATE: 77 BPM | SYSTOLIC BLOOD PRESSURE: 132 MMHG | OXYGEN SATURATION: 99 %

## 2023-11-02 DIAGNOSIS — Z01.810 ENCOUNTER FOR PREPROCEDURAL CARDIOVASCULAR EXAMINATION: ICD-10-CM

## 2023-11-02 PROCEDURE — 99213 OFFICE O/P EST LOW 20 MIN: CPT

## 2023-11-02 PROCEDURE — 93306 TTE W/DOPPLER COMPLETE: CPT

## 2023-11-02 PROCEDURE — 99203 OFFICE O/P NEW LOW 30 MIN: CPT

## 2023-11-06 ENCOUNTER — OUTPATIENT (OUTPATIENT)
Dept: OUTPATIENT SERVICES | Facility: HOSPITAL | Age: 68
LOS: 1 days | Discharge: HOME | End: 2023-11-06

## 2023-11-06 VITALS
DIASTOLIC BLOOD PRESSURE: 80 MMHG | OXYGEN SATURATION: 96 % | WEIGHT: 149.03 LBS | HEIGHT: 64 IN | TEMPERATURE: 98 F | RESPIRATION RATE: 17 BRPM | HEART RATE: 67 BPM | SYSTOLIC BLOOD PRESSURE: 135 MMHG

## 2023-11-06 DIAGNOSIS — N40.0 BENIGN PROSTATIC HYPERPLASIA WITHOUT LOWER URINARY TRACT SYMPTOMS: ICD-10-CM

## 2023-11-06 DIAGNOSIS — Z01.818 ENCOUNTER FOR OTHER PREPROCEDURAL EXAMINATION: ICD-10-CM

## 2023-11-06 DIAGNOSIS — E78.5 HYPERLIPIDEMIA, UNSPECIFIED: ICD-10-CM

## 2023-11-06 DIAGNOSIS — Z98.89 OTHER SPECIFIED POSTPROCEDURAL STATES: Chronic | ICD-10-CM

## 2023-11-06 DIAGNOSIS — N35.912 UNSPECIFIED BULBOUS URETHRAL STRICTURE, MALE: ICD-10-CM

## 2023-11-06 LAB
ALBUMIN SERPL ELPH-MCNC: 3.7 G/DL — SIGNIFICANT CHANGE UP (ref 3.3–5)
ALBUMIN SERPL ELPH-MCNC: 3.7 G/DL — SIGNIFICANT CHANGE UP (ref 3.3–5)
ALP SERPL-CCNC: 89 U/L — SIGNIFICANT CHANGE UP (ref 40–120)
ALP SERPL-CCNC: 89 U/L — SIGNIFICANT CHANGE UP (ref 40–120)
ALT FLD-CCNC: 25 U/L — SIGNIFICANT CHANGE UP (ref 12–78)
ALT FLD-CCNC: 25 U/L — SIGNIFICANT CHANGE UP (ref 12–78)
ANION GAP SERPL CALC-SCNC: 3 MMOL/L — LOW (ref 5–17)
ANION GAP SERPL CALC-SCNC: 3 MMOL/L — LOW (ref 5–17)
APPEARANCE UR: CLEAR — SIGNIFICANT CHANGE UP
APPEARANCE UR: CLEAR — SIGNIFICANT CHANGE UP
AST SERPL-CCNC: 14 U/L — LOW (ref 15–37)
AST SERPL-CCNC: 14 U/L — LOW (ref 15–37)
BASOPHILS # BLD AUTO: 0.02 K/UL — SIGNIFICANT CHANGE UP (ref 0–0.2)
BASOPHILS # BLD AUTO: 0.02 K/UL — SIGNIFICANT CHANGE UP (ref 0–0.2)
BASOPHILS NFR BLD AUTO: 0.4 % — SIGNIFICANT CHANGE UP (ref 0–2)
BASOPHILS NFR BLD AUTO: 0.4 % — SIGNIFICANT CHANGE UP (ref 0–2)
BILIRUB SERPL-MCNC: 0.5 MG/DL — SIGNIFICANT CHANGE UP (ref 0.2–1.2)
BILIRUB SERPL-MCNC: 0.5 MG/DL — SIGNIFICANT CHANGE UP (ref 0.2–1.2)
BILIRUB UR-MCNC: NEGATIVE — SIGNIFICANT CHANGE UP
BILIRUB UR-MCNC: NEGATIVE — SIGNIFICANT CHANGE UP
BUN SERPL-MCNC: 11 MG/DL — SIGNIFICANT CHANGE UP (ref 7–23)
BUN SERPL-MCNC: 11 MG/DL — SIGNIFICANT CHANGE UP (ref 7–23)
CALCIUM SERPL-MCNC: 9.3 MG/DL — SIGNIFICANT CHANGE UP (ref 8.5–10.1)
CALCIUM SERPL-MCNC: 9.3 MG/DL — SIGNIFICANT CHANGE UP (ref 8.5–10.1)
CHLORIDE SERPL-SCNC: 110 MMOL/L — HIGH (ref 96–108)
CHLORIDE SERPL-SCNC: 110 MMOL/L — HIGH (ref 96–108)
CO2 SERPL-SCNC: 27 MMOL/L — SIGNIFICANT CHANGE UP (ref 22–31)
CO2 SERPL-SCNC: 27 MMOL/L — SIGNIFICANT CHANGE UP (ref 22–31)
COLOR SPEC: YELLOW — SIGNIFICANT CHANGE UP
COLOR SPEC: YELLOW — SIGNIFICANT CHANGE UP
CREAT SERPL-MCNC: 1.02 MG/DL — SIGNIFICANT CHANGE UP (ref 0.5–1.3)
CREAT SERPL-MCNC: 1.02 MG/DL — SIGNIFICANT CHANGE UP (ref 0.5–1.3)
DIFF PNL FLD: NEGATIVE — SIGNIFICANT CHANGE UP
DIFF PNL FLD: NEGATIVE — SIGNIFICANT CHANGE UP
EGFR: 80 ML/MIN/1.73M2 — SIGNIFICANT CHANGE UP
EGFR: 80 ML/MIN/1.73M2 — SIGNIFICANT CHANGE UP
EOSINOPHIL # BLD AUTO: 0.22 K/UL — SIGNIFICANT CHANGE UP (ref 0–0.5)
EOSINOPHIL # BLD AUTO: 0.22 K/UL — SIGNIFICANT CHANGE UP (ref 0–0.5)
EOSINOPHIL NFR BLD AUTO: 4.5 % — SIGNIFICANT CHANGE UP (ref 0–6)
EOSINOPHIL NFR BLD AUTO: 4.5 % — SIGNIFICANT CHANGE UP (ref 0–6)
GLUCOSE SERPL-MCNC: 110 MG/DL — HIGH (ref 70–99)
GLUCOSE SERPL-MCNC: 110 MG/DL — HIGH (ref 70–99)
GLUCOSE UR QL: NEGATIVE MG/DL — SIGNIFICANT CHANGE UP
GLUCOSE UR QL: NEGATIVE MG/DL — SIGNIFICANT CHANGE UP
HCT VFR BLD CALC: 42.2 % — SIGNIFICANT CHANGE UP (ref 39–50)
HCT VFR BLD CALC: 42.2 % — SIGNIFICANT CHANGE UP (ref 39–50)
HGB BLD-MCNC: 14.2 G/DL — SIGNIFICANT CHANGE UP (ref 13–17)
HGB BLD-MCNC: 14.2 G/DL — SIGNIFICANT CHANGE UP (ref 13–17)
IMM GRANULOCYTES NFR BLD AUTO: 0.2 % — SIGNIFICANT CHANGE UP (ref 0–0.9)
IMM GRANULOCYTES NFR BLD AUTO: 0.2 % — SIGNIFICANT CHANGE UP (ref 0–0.9)
KETONES UR-MCNC: NEGATIVE MG/DL — SIGNIFICANT CHANGE UP
KETONES UR-MCNC: NEGATIVE MG/DL — SIGNIFICANT CHANGE UP
LEUKOCYTE ESTERASE UR-ACNC: NEGATIVE — SIGNIFICANT CHANGE UP
LEUKOCYTE ESTERASE UR-ACNC: NEGATIVE — SIGNIFICANT CHANGE UP
LYMPHOCYTES # BLD AUTO: 1.84 K/UL — SIGNIFICANT CHANGE UP (ref 1–3.3)
LYMPHOCYTES # BLD AUTO: 1.84 K/UL — SIGNIFICANT CHANGE UP (ref 1–3.3)
LYMPHOCYTES # BLD AUTO: 38 % — SIGNIFICANT CHANGE UP (ref 13–44)
LYMPHOCYTES # BLD AUTO: 38 % — SIGNIFICANT CHANGE UP (ref 13–44)
MCHC RBC-ENTMCNC: 28.9 PG — SIGNIFICANT CHANGE UP (ref 27–34)
MCHC RBC-ENTMCNC: 28.9 PG — SIGNIFICANT CHANGE UP (ref 27–34)
MCHC RBC-ENTMCNC: 33.6 G/DL — SIGNIFICANT CHANGE UP (ref 32–36)
MCHC RBC-ENTMCNC: 33.6 G/DL — SIGNIFICANT CHANGE UP (ref 32–36)
MCV RBC AUTO: 85.8 FL — SIGNIFICANT CHANGE UP (ref 80–100)
MCV RBC AUTO: 85.8 FL — SIGNIFICANT CHANGE UP (ref 80–100)
MONOCYTES # BLD AUTO: 0.26 K/UL — SIGNIFICANT CHANGE UP (ref 0–0.9)
MONOCYTES # BLD AUTO: 0.26 K/UL — SIGNIFICANT CHANGE UP (ref 0–0.9)
MONOCYTES NFR BLD AUTO: 5.4 % — SIGNIFICANT CHANGE UP (ref 2–14)
MONOCYTES NFR BLD AUTO: 5.4 % — SIGNIFICANT CHANGE UP (ref 2–14)
NEUTROPHILS # BLD AUTO: 2.49 K/UL — SIGNIFICANT CHANGE UP (ref 1.8–7.4)
NEUTROPHILS # BLD AUTO: 2.49 K/UL — SIGNIFICANT CHANGE UP (ref 1.8–7.4)
NEUTROPHILS NFR BLD AUTO: 51.5 % — SIGNIFICANT CHANGE UP (ref 43–77)
NEUTROPHILS NFR BLD AUTO: 51.5 % — SIGNIFICANT CHANGE UP (ref 43–77)
NITRITE UR-MCNC: NEGATIVE — SIGNIFICANT CHANGE UP
NITRITE UR-MCNC: NEGATIVE — SIGNIFICANT CHANGE UP
NRBC # BLD: 0 /100 WBCS — SIGNIFICANT CHANGE UP (ref 0–0)
NRBC # BLD: 0 /100 WBCS — SIGNIFICANT CHANGE UP (ref 0–0)
PH UR: 6 — SIGNIFICANT CHANGE UP (ref 5–8)
PH UR: 6 — SIGNIFICANT CHANGE UP (ref 5–8)
PLATELET # BLD AUTO: 189 K/UL — SIGNIFICANT CHANGE UP (ref 150–400)
PLATELET # BLD AUTO: 189 K/UL — SIGNIFICANT CHANGE UP (ref 150–400)
POTASSIUM SERPL-MCNC: 4.2 MMOL/L — SIGNIFICANT CHANGE UP (ref 3.5–5.3)
POTASSIUM SERPL-MCNC: 4.2 MMOL/L — SIGNIFICANT CHANGE UP (ref 3.5–5.3)
POTASSIUM SERPL-SCNC: 4.2 MMOL/L — SIGNIFICANT CHANGE UP (ref 3.5–5.3)
POTASSIUM SERPL-SCNC: 4.2 MMOL/L — SIGNIFICANT CHANGE UP (ref 3.5–5.3)
PROT SERPL-MCNC: 7.6 GM/DL — SIGNIFICANT CHANGE UP (ref 6–8.3)
PROT SERPL-MCNC: 7.6 GM/DL — SIGNIFICANT CHANGE UP (ref 6–8.3)
PROT UR-MCNC: NEGATIVE MG/DL — SIGNIFICANT CHANGE UP
PROT UR-MCNC: NEGATIVE MG/DL — SIGNIFICANT CHANGE UP
RBC # BLD: 4.92 M/UL — SIGNIFICANT CHANGE UP (ref 4.2–5.8)
RBC # BLD: 4.92 M/UL — SIGNIFICANT CHANGE UP (ref 4.2–5.8)
RBC # FLD: 13.2 % — SIGNIFICANT CHANGE UP (ref 10.3–14.5)
RBC # FLD: 13.2 % — SIGNIFICANT CHANGE UP (ref 10.3–14.5)
SODIUM SERPL-SCNC: 140 MMOL/L — SIGNIFICANT CHANGE UP (ref 135–145)
SODIUM SERPL-SCNC: 140 MMOL/L — SIGNIFICANT CHANGE UP (ref 135–145)
SP GR SPEC: 1.01 — SIGNIFICANT CHANGE UP (ref 1–1.03)
SP GR SPEC: 1.01 — SIGNIFICANT CHANGE UP (ref 1–1.03)
UROBILINOGEN FLD QL: 0.2 MG/DL — SIGNIFICANT CHANGE UP (ref 0.2–1)
UROBILINOGEN FLD QL: 0.2 MG/DL — SIGNIFICANT CHANGE UP (ref 0.2–1)
WBC # BLD: 4.84 K/UL — SIGNIFICANT CHANGE UP (ref 3.8–10.5)
WBC # BLD: 4.84 K/UL — SIGNIFICANT CHANGE UP (ref 3.8–10.5)
WBC # FLD AUTO: 4.84 K/UL — SIGNIFICANT CHANGE UP (ref 3.8–10.5)
WBC # FLD AUTO: 4.84 K/UL — SIGNIFICANT CHANGE UP (ref 3.8–10.5)

## 2023-11-06 NOTE — H&P PST ADULT - NEGATIVE GENERAL GENITOURINARY SYMPTOMS
Palliative Medicine  Nursing Note  [phone]  Fax [phone]        Clinic Office Visit  Patient Name: Grisel Sutton  YOB: [birth year]      Advance Care Planning 12/5/2019   Patient's Healthcare Decision Maker is: Verbal statement (Legal Next of Kin remains as decision maker)   Confirm Advance Directive None   Patient Would Like to Complete Advance Directive Yes   Does the patient have other document types -     MRI for chronic lumbar pain ordered per Dr. Sammy Sanford. Central Scheduling to call and arrange appt time and date. AVS reviewed with patient, verbalized an understanding of material discussed. Instructions given to patient to call the Palliative Medicine Office at [phone] for any questions or concerns 24 hours a day, 7 days a weeks. Follow up appointment scheduled for 4 weeks. Nurse Navigator will provide a follow up phone call as needed.       GONZALEZ TovarN, RN, [facility]  Palliative Medicine
no hematuria/no renal colic/no flank pain L/no flank pain R/no bladder infections/no dysuria/no urinary hesitancy/normal urinary frequency

## 2023-11-06 NOTE — H&P PST ADULT - ASSESSMENT
67 y/o male with pmhx of prostate ca () s/p prostate surgery 2008 here for presurgical examination for 67 y/o male with pmhx of prostate ca () s/p prostate surgery  here for presurgical examination for planned cystoscopy visual internal urethrotomy possible TURP on 11/10/2023 with Dr. Goncalves. His scheduled cystoscopy on 2023 was rescheduled due to covid infection. Denies history of ischemic heart disease, CHF, DM, CVA, TIA, or Kidney Disease. Denies resting or exertional chest pain, palpitations, headache, N/V, SOB, syncope or blurry vision. Denies personal or family hx of bleeding disorder or adverse reaction with anesthesia. Denies hx of DVT or PE. Denies recent travels in the past 30 days. No fever, SOB, cough, flu-like symptoms or body rash covid screen. He reports that he was told to stop tamsulosisn 2 weeks ago   His scheduled cystoscopy on 2023 was rescheduled due to covid infection. Denies history of ischemic heart disease, CHF, DM, CVA, TIA, or Kidney Disease. Denies resting or exertional chest pain, palpitations, headache, N/V, SOB, syncope or blurry vision. Denies personal or family hx of bleeding disorder or adverse reaction with anesthesia. Denies hx of DVT or PE. Denies recent travels in the past 30 days. No fever, SOB, cough, flu-like symptoms or body rash covid screen. He reports that he was told to stop tamsulosisn 2 weeks ago    CAPRINI SCORE [CLOT]    AGE RELATED RISK FACTORS                                                       MOBILITY RELATED FACTORS  [ ] Age 41-60 years                                            (1 Point)                  [ ] Bed rest                                                        (1 Point)  [x ] Age: 61-74 years                                           (2 Points)                 [ ] Plaster cast                                                   (2 Points)  [ ] Age= 75 years                                              (3 Points)                 [ ] Bed bound for more than 72 hours                 (2 Points)    DISEASE RELATED RISK FACTORS                                               GENDER SPECIFIC FACTORS  [ ] Edema in the lower extremities                       (1 Point)                  [ ] Pregnancy                                                     (1 Point)  [ ] Varicose veins                                               (1 Point)                  [ ] Post-partum < 6 weeks                                   (1 Point)             [x ] BMI > 25 Kg/m2                                            (1 Point)                  [ ] Hormonal therapy  or oral contraception          (1 Point)                 [ ] Sepsis (in the previous month)                        (1 Point)                  [ ] History of pregnancy complications                 (1 point)  [ ] Pneumonia or serious lung disease                                               [ ] Unexplained or recurrent                     (1 Point)           (in the previous month)                               (1 Point)  [ ] Abnormal pulmonary function test                     (1 Point)                 SURGERY RELATED RISK FACTORS  [ ] Acute myocardial infarction                              (1 Point)                 [ ]  Section                                             (1 Point)  [ ] Congestive heart failure (in the previous month)  (1 Point)               [ ] Minor surgery                                                  (1 Point)   [ ] Inflammatory bowel disease                             (1 Point)                 [ ] Arthroscopic surgery                                        (2 Points)  [ ] Central venous access                                      (2 Points)                [x ] General surgery lasting more than 45 minutes   (2 Points)       [ ] Stroke (in the previous month)                          (5 Points)               [ ] Elective arthroplasty                                         (5 Points)                                                                                                                                               HEMATOLOGY RELATED FACTORS                                                 TRAUMA RELATED RISK FACTORS  [ ] Prior episodes of VTE                                     (3 Points)                [ ] Fracture of the hip, pelvis, or leg                       (5 Points)  [ ] Positive family history for VTE                         (3 Points)                 [ ] Acute spinal cord injury (in the previous month)  (5 Points)  [ ] Prothrombin 66112 A                                     (3 Points)                 [ ] Paralysis  (less than 1 month)                             (5 Points)  [ ] Factor V Leiden                                             (3 Points)                  [ ] Multiple Trauma within 1 month                        (5 Points)  [ ] Lupus anticoagulants                                     (3 Points)                                                           [ ] Anticardiolipin antibodies                               (3 Points)                                                       [ ] High homocysteine in the blood                      (3 Points)                                             [ ] Other congenital or acquired thrombophilia      (3 Points)                                                [ ] Heparin induced thrombocytopenia                  (3 Points)                                          Total Score [    5      ]    Caprini Score 0 - 2:  Low Risk, No VTE Prophylaxis required for most patients, encourage ambulation  Caprini Score 3 - 6:  At Risk, pharmacologic VTE prophylaxis is indicated for most patients (in the absence of a contraindication)  Caprini Score Greater than or = 7:  High Risk, pharmacologic VTE prophylaxis is indicated for most patients (in the absence of a contraindication)  Caprini Score 0-2: Low risk, No VTE Prophylaxis required for most patient's, encourage ambulation  Caprini Score 3-6: At Risk, Pharmacologic VTE prophylaxis is indicated for most patients ( in the absence of a contraindication)  Caprini Score Greater than or = 7: High Risk , pharmacologic VTE is indicated for most patients ( in the absence of a contraindication)    Caprini score indicates that the patient is high risk for VTE event ( score 6 or greater). Surgical patient's in this group will benefit from both pharmacologic prophylaxis and intermittent compression devices . Surgical team will determine the balance between VTE  risk and bleeding risk and other clinical considerations   67 y/o male with pmhx of hld, prostate ca () s/p prostate surgery  here for presurgical examination for 67 y/o male with pmhx of prostate ca () s/p prostate surgery  here for presurgical examination for planned cystoscopy visual internal urethrotomy possible TURP on 11/10/2023 with Dr. Goncalves. He was previously scheduled for a cystoscopy on 2023 but was rescheduled due to covid infection. Denies history of ischemic heart disease, CHF, DM, CVA, TIA, or Kidney Disease. Denies resting or exertional chest pain, palpitations, headache, N/V, SOB, syncope or blurry vision. Denies personal or family hx of bleeding disorder or adverse reaction with anesthesia. Denies hx of DVT or PE. Denies recent travels in the past 30 days. No fever, SOB, cough, flu-like symptoms or body rash covid screen. He reports that he was told to stop Tamsulosin 2 weeks ago     CAPRINI SCORE [CLOT]    AGE RELATED RISK FACTORS                                                       MOBILITY RELATED FACTORS  [ ] Age 41-60 years                                            (1 Point)                  [ ] Bed rest                                                        (1 Point)  [x ] Age: 61-74 years                                           (2 Points)                 [ ] Plaster cast                                                   (2 Points)  [ ] Age= 75 years                                              (3 Points)                 [ ] Bed bound for more than 72 hours                 (2 Points)    DISEASE RELATED RISK FACTORS                                               GENDER SPECIFIC FACTORS  [ ] Edema in the lower extremities                       (1 Point)                  [ ] Pregnancy                                                     (1 Point)  [ ] Varicose veins                                               (1 Point)                  [ ] Post-partum < 6 weeks                                   (1 Point)             [x ] BMI > 25 Kg/m2                                            (1 Point)                  [ ] Hormonal therapy  or oral contraception          (1 Point)                 [ ] Sepsis (in the previous month)                        (1 Point)                  [ ] History of pregnancy complications                 (1 point)  [ ] Pneumonia or serious lung disease                                               [ ] Unexplained or recurrent                     (1 Point)           (in the previous month)                               (1 Point)  [ ] Abnormal pulmonary function test                     (1 Point)                 SURGERY RELATED RISK FACTORS  [ ] Acute myocardial infarction                              (1 Point)                 [ ]  Section                                             (1 Point)  [ ] Congestive heart failure (in the previous month)  (1 Point)               [ ] Minor surgery                                                  (1 Point)   [ ] Inflammatory bowel disease                             (1 Point)                 [ ] Arthroscopic surgery                                        (2 Points)  [ ] Central venous access                                      (2 Points)                [x ] General surgery lasting more than 45 minutes   (2 Points)       [ ] Stroke (in the previous month)                          (5 Points)               [ ] Elective arthroplasty                                         (5 Points)                                                                                                                                               HEMATOLOGY RELATED FACTORS                                                 TRAUMA RELATED RISK FACTORS  [ ] Prior episodes of VTE                                     (3 Points)                [ ] Fracture of the hip, pelvis, or leg                       (5 Points)  [ ] Positive family history for VTE                         (3 Points)                 [ ] Acute spinal cord injury (in the previous month)  (5 Points)  [ ] Prothrombin 76777 A                                     (3 Points)                 [ ] Paralysis  (less than 1 month)                             (5 Points)  [ ] Factor V Leiden                                             (3 Points)                  [ ] Multiple Trauma within 1 month                        (5 Points)  [ ] Lupus anticoagulants                                     (3 Points)                                                           [ ] Anticardiolipin antibodies                               (3 Points)                                                       [ ] High homocysteine in the blood                      (3 Points)                                             [ ] Other congenital or acquired thrombophilia      (3 Points)                                                [ ] Heparin induced thrombocytopenia                  (3 Points)                                          Total Score [    5      ]    Caprini Score 0 - 2:  Low Risk, No VTE Prophylaxis required for most patients, encourage ambulation  Caprini Score 3 - 6:  At Risk, pharmacologic VTE prophylaxis is indicated for most patients (in the absence of a contraindication)  Caprini Score Greater than or = 7:  High Risk, pharmacologic VTE prophylaxis is indicated for most patients (in the absence of a contraindication)  Caprini Score 0-2: Low risk, No VTE Prophylaxis required for most patient's, encourage ambulation  Caprini Score 3-6: At Risk, Pharmacologic VTE prophylaxis is indicated for most patients ( in the absence of a contraindication)  Caprini Score Greater than or = 7: High Risk , pharmacologic VTE is indicated for most patients ( in the absence of a contraindication)    Caprini score indicates that the patient is high risk for VTE event ( score 6 or greater). Surgical patient's in this group will benefit from both pharmacologic prophylaxis and intermittent compression devices . Surgical team will determine the balance between VTE  risk and bleeding risk and other clinical considerations

## 2023-11-06 NOTE — H&P PST ADULT - NSHP PST DIAGEKG REVIEWED YN_GEN_A_CORE
[address] 
[phone] Patient: Flavio Mchugh MRN: [id number] :2003 Visit Information Date & Time Provider Department Dept. Phone Encounter #  
 2018 11:45 AM Wicho Maurer, [address] [facility] [phone] [id number] Follow-up Instructions Return if symptoms worsen or fail to improve. Upcoming Health Maintenance Date Due Hepatitis B Peds Age 0-18 (1 of 3 - Primary Series) 2003 IPV Peds Age 0-24 (1 of 4 - All-IPV Series) 1/3/2004 Hepatitis A Peds Age 1-18 (1 of 2 - Standard Series) 11/3/2004 MMR Peds Age 1-18 (1 of 2) 11/3/2004 DTaP/Tdap/Td series (1 - Tdap) 11/3/2010 HPV Age 9Y-34Y (1 of 2 - Male 2-Dose Series) 11/3/2014 MCV through Age 25 (1 of 2) 11/3/2014 Varicella Peds Age 1-18 (1 of 2 - 2 Dose Adolescent Series) 11/3/2016 Influenza Age 5 to Adult 2018 Allergies as of 2018  Review Complete On: 2018 By: Tracey Truong LPN No Known Allergies Current Immunizations  Never Reviewed No immunizations on file. Not reviewed this visit You Were Diagnosed With   
  
 Codes Comments Sports physical    -  Primary ICD-10-CM: Z02.5 ICD-9-CM: V70.3 Cellulitis of toe of left foot     ICD-10-CM: Y67.922 
ICD-9-CM: 681.10 Vitals BP Pulse Temp Resp Height(growth percentile) 110/67 (30 %/ 56 %)* (BP 1 Location: Left arm, BP Patient Position: Sitting) 74 98.2 °F (36.8 °C) (Oral) 22 5' 9\" (1.753 m) (81 %, Z= 0.86) Weight(growth percentile) SpO2 BMI Smoking Status 135 lb 3.2 oz (61.3 kg) (72 %, Z= 0.58) 97% 19.97 kg/m2 (55 %, Z= 0.12) Never Smoker *BP percentiles are based on NHBPEP's 4th Report Growth percentiles are based on CDC 2-20 Years data. Vitals History BMI and BSA Data  Body Mass Index Body Surface Area  
 19.97 kg/m 2 1.73 m 2  
  
  
 Preferred Pharmacy Pharmacy Name Phone [facility] #[id number] [facility] [place] [phone] Your Updated Medication List  
  
   
This list is accurate as of 7/28/18 12:11 PM.  Always use your most recent med list.  
  
  
  
  
 doxycycline 100 mg tablet Commonly known as:  ADOXA Take 1 Tab by mouth two (2) times a day. OTHER Indications: Juice Plus Prescriptions Sent to Pharmacy Refills  
 doxycycline (ADOXA) 100 mg tablet 0 Sig: Take 1 Tab by mouth two (2) times a day. Class: Normal  
 Pharmacy: [facility], [address] [place] [address] Ph #: [phone] Route: Oral  
  
Follow-up Instructions Return if symptoms worsen or fail to improve. Patient Instructions Paronychia in Children: Care Instructions Your Care Instructions Paronychia (say \"ritq-iy-CX-lela-uh\") is an infection of the skin around a fingernail or toenail. It happens when germs enter through a break in the skin. The doctor may have made a small cut in the infected area to drain the pus. Most cases of paronychia improve in a few days. But watch your child's symptoms and follow your doctor's advice. Though rare, a mild case can turn into something more serious and infect the entire finger or toe. Also, it is possible for an infection to return. Follow-up care is a key part of your child's treatment and safety. Be sure to make and go to all appointments, and call your doctor if your child is having problems. It's also a good idea to know your child's test results and keep a list of the medicines your child takes. How can you care for your child at home? · If your doctor told you how to care for your child's infected nail, follow your doctor's instructions. If you did not get instructions, follow this general advice: ¨ Wash the area with clean water 2 times a day. Don't use hydrogen peroxide or alcohol, which can slow healing. ¨ You may cover the area with a thin layer of petroleum jelly, such as Vaseline, and a nonstick bandage. ¨ Apply more petroleum jelly and replace the bandage as needed. · If the doctor prescribed antibiotics for your child, give them as directed. Do not stop using them just because your child feels better. Your child needs to take the full course of antibiotics. · Give your child an over-the-counter pain medicine, such as acetaminophen (Tylenol) or ibuprofen (Advil, Motrin). Be safe with medicines. Read and follow all instructions on the label. · Do not give a child two or more pain medicines at the same time unless the doctor told you to. Many pain medicines have acetaminophen, which is Tylenol. Too much acetaminophen (Tylenol) can be harmful. · Prop up the toe or finger so that it is higher than the level of your child's heart. This will help with pain and swelling. · Apply heat. Put a warm water bottle or a warm cloth on the finger or toe. Keep a cloth between the warm water bottle and your child's skin. · Soak the area in warm water twice a day for 15 minutes each time. After soaking, dry the area well and apply a thin layer of petroleum jelly, such as Vaseline. Put on a new bandage. When should you call for help? Call your doctor now or seek immediate medical care if: 
  · Your child has signs of new or worsening infection, such as: 
¨ Increased pain, swelling, warmth, or redness. ¨ Red streaks leading from the infected skin. ¨ Pus draining from the area. ¨ A fever.  
 Watch closely for changes in your child's health, and be sure to contact your doctor if: 
  · Your child does not get better as expected. Where can you learn more? Go to http://amber-amberly.info/. Enter A223 in the search box to learn more about \"Paronychia in Children: Care Instructions. \" Current as of: October 5, 2017 Content Version: 11.7 © 2996-8434 Healthwise, Incorporated. Care instructions adapted under license by [employer] (which disclaims liability or warranty for this information). If you have questions about a medical condition or this instruction, always ask your healthcare professional. [address] any warranty or liability for your use of this information. Learning About Sports Physicals for Children Why does your child need a sports physical? 
 
Before your child starts to play a sport, it's a good idea for the child to get a sports physical exam. Some sports programs may require a sports physical before your child can play. Many school sports programs offer a screening right at the school. A sports physical can screen for some health problems that could be a problem for your child in some sports. It's not done to keep your child from playing sports. It will give you, the doctor, and your child's coaches facts to help protect your child. What happens during the sports physical? 
During a sports physical, your child's height and weight will be measured. Your child's blood pressure will be checked. He or she may also get a vision screening. The doctor will listen to your child's heart and lungs. He or she will look at and feel certain parts of your child's body. Boys may be checked for a hernia or a problem with their testicles. Your child's joints and muscles will be tested to see how strong and flexible they are. The doctor will also ask about your child's past health. The doctor will review your child's vaccine record. Your child may get any needed vaccines to bring the record up to date. The doctor and your child may talk about any gear your child will need to protect from injuries while playing a sport. They may also talk about diet, exercise, and other lifestyle issues.  
How can you prepare for the sports physical? 
Before your child's sports physical, gather any records that your doctor might need. This includes details about: · Any injuries and health problems. · Other exams by a doctor or dentist. 
· Any serious illness in your family. · Vaccines to protect your child from things such as measles or mumps. You may be asked to complete a questionnaire before you come to the sports physical. This can help the doctor evaluate your child's health. Be sure to tell the doctor about things that may seem minor, like a slight cough or backache. And let the doctor know what sport your child will play. Each sport calls for its own level of fitness. Follow-up care is a key part of your child's treatment and safety. Be sure to make and go to all appointments, and call your doctor if your child is having problems. It's also a good idea to know your child's test results and keep a list of the medicines your child takes. Where can you learn more? Go to http://amber-amberly.info/. Enter J111 in the search box to learn more about \"Learning About Sports Physicals for Children. \" Current as of: February 26, 2018 Content Version: 11.7 © 6953-8269 [employer], Incorporated. Care instructions adapted under license by [employer] (which disclaims liability or warranty for this information). If you have questions about a medical condition or this instruction, always ask your healthcare professional. [address] any warranty or liability for your use of this information. Introducing [facility]! Dear Parent or Guardian, Thank you for requesting a Invenias account for your child. With Invenias, you can view your childs hospital or ER discharge instructions, current allergies, immunizations and much more. In order to access your childs information, we require a signed consent on file. Please see the MobileIgniter department or call [phone] for instructions on completing a Invenias Proxy request.   
Additional Information If you have questions, please visit the Frequently Asked Questions section of the EZ-[email or web address] website at [email or web address] Remember, Unified Social is NOT to be used for urgent needs. For medical emergencies, dial 911. Now available from your iPhone and Android! Please provide this summary of care documentation to your next provider. Your primary care clinician is listed as Phys Other. If you have any questions after today's visit, please call [phone]. Yes

## 2023-11-06 NOTE — H&P PST ADULT - HISTORY OF PRESENT ILLNESS
69 y/o male with pmhx of prostate ca (2007) s/p prostate surgery 2008 here for presurgical examination for planned cystoscopy visual internal urethrotomy possible TURP on 11/10/2023 with Dr. Goncalves. His scheduled cystoscopy was rescheduled due to covid infection in 9/2023. Denies history of ischemic heart disease, CHF, DM, CVA, TIA, or Kidney Disease. Denies resting or exertional chest pain, palpitations, headache, N/V, SOB, syncope or blurry vision. Denies personal or family hx of bleeding disorder or adverse reaction with anesthesia. Denies hx of DVT or PE. Denies recent travels in the past 30 days. No fever, SOB, cough, flu-like symptoms or body rash covid screen.   67 y/o male with pmhx of prostate ca (2007) s/p prostate surgery 2008 here for presurgical examination for planned cystoscopy visual internal urethrotomy possible TURP on 11/10/2023 with Dr. Goncalves. His scheduled cystoscopy on 9/2023 was rescheduled due to covid infection. Denies history of ischemic heart disease, CHF, DM, CVA, TIA, or Kidney Disease. Denies resting or exertional chest pain, palpitations, headache, N/V, SOB, syncope or blurry vision. Denies personal or family hx of bleeding disorder or adverse reaction with anesthesia. Denies hx of DVT or PE. Denies recent travels in the past 30 days. No fever, SOB, cough, flu-like symptoms or body rash covid screen. He reports that he was told to stop tamsulosisn 2 weeks ago.   67 y/o male with pmhx of hld, prostate ca (2007) s/p prostate surgery 2008 here for presurgical examination for planned cystoscopy visual internal urethrotomy possible TURP on 11/10/2023 with Dr. Goncalves. He was previously scheduled for a cystoscopy on 9/2023 but was rescheduled due to covid infection. Denies history of ischemic heart disease, CHF, DM, CVA, TIA, or Kidney Disease. Denies resting or exertional chest pain, palpitations, headache, N/V, SOB, syncope or blurry vision. Denies personal or family hx of bleeding disorder or adverse reaction with anesthesia. Denies hx of DVT or PE. Denies recent travels in the past 30 days. No fever, SOB, cough, flu-like symptoms or body rash covid screen. He reports that he was told to stop tamsulosisn 2 weeks ago.

## 2023-11-06 NOTE — H&P PST ADULT - PROBLEM SELECTOR PLAN 1
Scheduled for planned cystoscopy visual internal urethrotomy possible TURP on 11/10/2023 with Dr. Goncavles.

## 2023-11-06 NOTE — H&P PST ADULT - ATTENDING COMMENTS
Procedure, Alternatives, Benefits and Risks discussed, all questions answered. Patient understands and requests to proceed with the plan and procedure.

## 2023-11-06 NOTE — H&P PST ADULT - GASTROINTESTINAL
normal/soft/nontender/nondistended/normal active bowel sounds negative normal/soft/nontender/nondistended/normal active bowel sounds/no guarding/no rigidity

## 2023-11-07 ENCOUNTER — APPOINTMENT (OUTPATIENT)
Dept: FAMILY MEDICINE | Facility: CLINIC | Age: 68
End: 2023-11-07
Payer: MEDICARE

## 2023-11-07 VITALS
HEART RATE: 86 BPM | RESPIRATION RATE: 16 BRPM | DIASTOLIC BLOOD PRESSURE: 78 MMHG | SYSTOLIC BLOOD PRESSURE: 129 MMHG | OXYGEN SATURATION: 96 % | BODY MASS INDEX: 25.27 KG/M2 | WEIGHT: 148 LBS | TEMPERATURE: 97.8 F | HEIGHT: 64 IN

## 2023-11-07 DIAGNOSIS — Z01.818 ENCOUNTER FOR OTHER PREPROCEDURAL EXAMINATION: ICD-10-CM

## 2023-11-07 LAB
CULTURE RESULTS: SIGNIFICANT CHANGE UP
CULTURE RESULTS: SIGNIFICANT CHANGE UP
SPECIMEN SOURCE: SIGNIFICANT CHANGE UP
SPECIMEN SOURCE: SIGNIFICANT CHANGE UP

## 2023-11-07 PROCEDURE — 99214 OFFICE O/P EST MOD 30 MIN: CPT | Mod: 25

## 2023-11-08 LAB
ABO + RH PNL BLD: NORMAL
CHOLEST SERPL-MCNC: 199 MG/DL
HDLC SERPL-MCNC: 43 MG/DL
LDLC SERPL CALC-MCNC: 100 MG/DL
NONHDLC SERPL-MCNC: 156 MG/DL
TRIGL SERPL-MCNC: 331 MG/DL

## 2023-11-09 ENCOUNTER — TRANSCRIPTION ENCOUNTER (OUTPATIENT)
Age: 68
End: 2023-11-09

## 2023-11-10 ENCOUNTER — RX RENEWAL (OUTPATIENT)
Age: 68
End: 2023-11-10

## 2023-11-10 ENCOUNTER — TRANSCRIPTION ENCOUNTER (OUTPATIENT)
Age: 68
End: 2023-11-10

## 2023-11-10 ENCOUNTER — OUTPATIENT (OUTPATIENT)
Dept: OUTPATIENT SERVICES | Facility: HOSPITAL | Age: 68
LOS: 1 days | Discharge: HOME | End: 2023-11-10
Payer: MEDICARE

## 2023-11-10 ENCOUNTER — APPOINTMENT (OUTPATIENT)
Dept: UROLOGY | Facility: HOSPITAL | Age: 68
End: 2023-11-10

## 2023-11-10 VITALS
TEMPERATURE: 98 F | HEART RATE: 64 BPM | SYSTOLIC BLOOD PRESSURE: 129 MMHG | RESPIRATION RATE: 16 BRPM | OXYGEN SATURATION: 97 % | DIASTOLIC BLOOD PRESSURE: 76 MMHG

## 2023-11-10 VITALS
TEMPERATURE: 98 F | SYSTOLIC BLOOD PRESSURE: 148 MMHG | RESPIRATION RATE: 14 BRPM | DIASTOLIC BLOOD PRESSURE: 89 MMHG | HEIGHT: 64 IN | OXYGEN SATURATION: 96 % | WEIGHT: 149.03 LBS | HEART RATE: 75 BPM

## 2023-11-10 DIAGNOSIS — Z98.89 OTHER SPECIFIED POSTPROCEDURAL STATES: Chronic | ICD-10-CM

## 2023-11-10 LAB
BLD GP AB SCN SERPL QL: SIGNIFICANT CHANGE UP
BLD GP AB SCN SERPL QL: SIGNIFICANT CHANGE UP

## 2023-11-10 PROCEDURE — 52276 CYSTOSCOPY AND TREATMENT: CPT

## 2023-11-10 DEVICE — URETERAL CATH OPEN END FLEXI-TIP 5FR .038" X 70CM: Type: IMPLANTABLE DEVICE | Site: PROSTATE | Status: FUNCTIONAL

## 2023-11-10 DEVICE — GUIDEWIRE AMPLATZ SUPER-STIFF STRAIGHT .035" X 75CM: Type: IMPLANTABLE DEVICE | Site: PROSTATE | Status: FUNCTIONAL

## 2023-11-10 DEVICE — LASER FIBER SOLTIVE 550: Type: IMPLANTABLE DEVICE | Site: PROSTATE | Status: FUNCTIONAL

## 2023-11-10 RX ORDER — FENTANYL CITRATE 50 UG/ML
50 INJECTION INTRAVENOUS
Refills: 0 | Status: DISCONTINUED | OUTPATIENT
Start: 2023-11-10 | End: 2023-11-10

## 2023-11-10 RX ORDER — FUROSEMIDE 40 MG
10 TABLET ORAL ONCE
Refills: 0 | Status: COMPLETED | OUTPATIENT
Start: 2023-11-10 | End: 2023-11-10

## 2023-11-10 RX ORDER — CIPROFLOXACIN LACTATE 400MG/40ML
1 VIAL (ML) INTRAVENOUS
Qty: 6 | Refills: 0
Start: 2023-11-10 | End: 2023-11-12

## 2023-11-10 RX ORDER — SIMVASTATIN 20 MG/1
1 TABLET, FILM COATED ORAL
Refills: 0 | DISCHARGE

## 2023-11-10 RX ORDER — FENTANYL CITRATE 50 UG/ML
25 INJECTION INTRAVENOUS
Refills: 0 | Status: DISCONTINUED | OUTPATIENT
Start: 2023-11-10 | End: 2023-11-10

## 2023-11-10 RX ORDER — SODIUM CHLORIDE 9 MG/ML
1000 INJECTION, SOLUTION INTRAVENOUS
Refills: 0 | Status: DISCONTINUED | OUTPATIENT
Start: 2023-11-10 | End: 2023-11-10

## 2023-11-10 RX ORDER — SODIUM CHLORIDE 9 MG/ML
3 INJECTION INTRAMUSCULAR; INTRAVENOUS; SUBCUTANEOUS EVERY 8 HOURS
Refills: 0 | Status: DISCONTINUED | OUTPATIENT
Start: 2023-11-10 | End: 2023-11-10

## 2023-11-10 RX ORDER — ONDANSETRON 8 MG/1
4 TABLET, FILM COATED ORAL ONCE
Refills: 0 | Status: DISCONTINUED | OUTPATIENT
Start: 2023-11-10 | End: 2023-11-10

## 2023-11-10 RX ADMIN — SODIUM CHLORIDE 75 MILLILITER(S): 9 INJECTION, SOLUTION INTRAVENOUS at 09:37

## 2023-11-10 RX ADMIN — FENTANYL CITRATE 50 MICROGRAM(S): 50 INJECTION INTRAVENOUS at 09:36

## 2023-11-10 RX ADMIN — FENTANYL CITRATE 50 MICROGRAM(S): 50 INJECTION INTRAVENOUS at 09:51

## 2023-11-10 RX ADMIN — Medication 10 MILLIGRAM(S): at 09:28

## 2023-11-10 NOTE — ASU DISCHARGE PLAN (ADULT/PEDIATRIC) - NS MD DC FALL RISK RISK
For information on Fall & Injury Prevention, visit: [email or web address] OR  [email or web address] OR  https://www.cdc.gov/steadi/patient.html

## 2023-11-10 NOTE — ASU DISCHARGE PLAN (ADULT/PEDIATRIC) - ASU DC SPECIAL INSTRUCTIONSFT
Empty allen catheter bag as needed. Will follow up in 1 week with Dr Goncalves in the office to remove the allen catheter.

## 2023-11-10 NOTE — ASU PATIENT PROFILE, ADULT - FALL HARM RISK - HARM RISK INTERVENTIONS

## 2023-11-10 NOTE — ASU PREOP CHECKLIST - HAND OFF
,[email or web address]
Unit RN to OR RN

## 2023-11-10 NOTE — ASU DISCHARGE PLAN (ADULT/PEDIATRIC) - CARE PROVIDER_API CALL
Allyssa Sarabia  Physician Assistant Services  [address] [address] [place], [place] [address]  Phone: [phone]  Fax: [phone]  Established Patient  Follow Up Time: 1 week

## 2023-11-16 ENCOUNTER — APPOINTMENT (OUTPATIENT)
Dept: UROLOGY | Facility: CLINIC | Age: 68
End: 2023-11-16
Payer: MEDICARE

## 2023-11-16 VITALS
HEART RATE: 84 BPM | SYSTOLIC BLOOD PRESSURE: 142 MMHG | TEMPERATURE: 98.2 F | OXYGEN SATURATION: 95 % | DIASTOLIC BLOOD PRESSURE: 89 MMHG

## 2023-11-16 PROCEDURE — 99024 POSTOP FOLLOW-UP VISIT: CPT

## 2023-11-16 PROCEDURE — 51700 IRRIGATION OF BLADDER: CPT

## 2023-11-16 PROCEDURE — A4216: CPT | Mod: NC

## 2023-11-16 RX ORDER — OMEPRAZOLE 20 MG/1
20 TABLET, DELAYED RELEASE ORAL DAILY
Qty: 30 | Refills: 0 | Status: DISCONTINUED | COMMUNITY
Start: 2023-08-22 | End: 2023-11-16

## 2023-11-16 RX ORDER — TAMSULOSIN HYDROCHLORIDE 0.4 MG/1
0.4 CAPSULE ORAL
Qty: 90 | Refills: 1 | Status: DISCONTINUED | COMMUNITY
Start: 2020-07-02 | End: 2023-11-16

## 2023-11-17 DIAGNOSIS — Z88.0 ALLERGY STATUS TO PENICILLIN: ICD-10-CM

## 2023-11-17 DIAGNOSIS — N35.913 UNSPECIFIED MEMBRANOUS URETHRAL STRICTURE, MALE: ICD-10-CM

## 2023-11-17 DIAGNOSIS — E66.3 OVERWEIGHT: ICD-10-CM

## 2023-11-17 DIAGNOSIS — N40.0 BENIGN PROSTATIC HYPERPLASIA WITHOUT LOWER URINARY TRACT SYMPTOMS: ICD-10-CM

## 2023-11-17 DIAGNOSIS — Z85.46 PERSONAL HISTORY OF MALIGNANT NEOPLASM OF PROSTATE: ICD-10-CM

## 2023-11-17 DIAGNOSIS — N32.89 OTHER SPECIFIED DISORDERS OF BLADDER: ICD-10-CM

## 2023-11-17 DIAGNOSIS — N35.919 UNSPECIFIED URETHRAL STRICTURE, MALE, UNSPECIFIED SITE: ICD-10-CM

## 2023-11-17 DIAGNOSIS — M19.042 PRIMARY OSTEOARTHRITIS, LEFT HAND: ICD-10-CM

## 2023-11-17 DIAGNOSIS — E78.5 HYPERLIPIDEMIA, UNSPECIFIED: ICD-10-CM

## 2023-12-04 ENCOUNTER — APPOINTMENT (OUTPATIENT)
Dept: CARDIOLOGY | Facility: CLINIC | Age: 68
End: 2023-12-04

## 2023-12-27 ENCOUNTER — APPOINTMENT (OUTPATIENT)
Dept: UROLOGY | Facility: CLINIC | Age: 68
End: 2023-12-27
Payer: MEDICARE

## 2023-12-27 VITALS
BODY MASS INDEX: 25.61 KG/M2 | OXYGEN SATURATION: 94 % | HEART RATE: 77 BPM | SYSTOLIC BLOOD PRESSURE: 128 MMHG | TEMPERATURE: 98.3 F | WEIGHT: 150 LBS | HEIGHT: 64 IN | DIASTOLIC BLOOD PRESSURE: 81 MMHG

## 2023-12-27 LAB
APPEARANCE: CLEAR
BILIRUBIN URINE: NEGATIVE
BLOOD URINE: NEGATIVE
COLOR: YELLOW
GLUCOSE QUALITATIVE U: NEGATIVE MG/DL
KETONES URINE: NEGATIVE MG/DL
LEUKOCYTE ESTERASE URINE: NEGATIVE
NITRITE URINE: NEGATIVE
PH URINE: 5.5
PROTEIN URINE: NEGATIVE MG/DL
SPECIFIC GRAVITY URINE: 1.02
UROBILINOGEN URINE: 0.2 MG/DL

## 2023-12-27 PROCEDURE — 51798 US URINE CAPACITY MEASURE: CPT

## 2023-12-27 PROCEDURE — 99213 OFFICE O/P EST LOW 20 MIN: CPT

## 2023-12-27 NOTE — HISTORY OF PRESENT ILLNESS
[FreeTextEntry1] : 07/13/2022: Mr. HUNT is a 67 year male  is here for Ca Prostate and ED.  Ca Prostate: diagnosed in 2007. Biopsy Everett Score 6, PSA 3.4 ng/ml, treated with I 125  Seed implant. Has increased frequency in the night X 4, most likely secondary to radiation. On Tamsulosin. No significant residual urine. PSA: 6/15/22:  0.01 ng/mL     Mr. Hunt is having back pain. 2011 he had 2 cysts in the kidney. and an inderminate 1.5 cm right renal mass, no change as of last CT 2021. Repeat CT scan requested.  Mr. Hunt is stating his urinary stream and flow is slow. Patient is voiding and emptying bladder completely.  PVR: 0. N x4-  5.  Day time : q 4 h. On Tamsulosin.  ED: has poor erections and was given Sildenafil and is not helping. He has soft erections, but will not last. Mr. Hunt was educated how to use the medication properly, and medicine renewed.  RTC: one month to check CT scan.    08/17/2022: Mr. HUNT is a 67 year male who presents today for a follow up for Ca Prostate, Right Renal Mass, Nocturia, and ED  Ca Prostate:  Diagnosed in 2007. Biopsy Everett Score 6, PSA 3.4 ng/ml, treated with I 125  Seed implant. No lymph nodes found in CT scan done on 07/20/22. Stable.  Right Renal Mass: 1.2 cm septated cystic lesion of upper right kidney. No changes since 2016. On sonogram: cystic. Renal cyst. Unlikely to be malignant.   Nocturia: Most likely secondary to radiation.  Pt is urinating q 2 hrs at night time and q 2-3 hrs during the day. His stream is slow. Taking Tamsulosin.   ED: Mr. Hunt has poor erections and is taking Sildenafil 100 mg. It is working for him and he will take it PRN   RTC: 1 year Follow up : UA, Culture, Uro -flow, and PVR      08/16/2023: Mr. HUNT is a 68 year male who presents today for a follow up for Ca Prostate, Right Renal Mass, and ED Complaining of slow urine and increased frequency of urination, already on Tamsulosin. Uroflow is slow, PVR 57 ml. S/P brachytherapy 2011. Will schedule for cystoscopy to r/o urethral stricture and evaluate prostatic fossa.   Ca Prostate: Developed cancer at the age of 52. Diagnosed in 2007. Biopsy Everett Score 6, PSA 3.4 ng/ml Treated with I 125 Seed implant.  PSA: 03/08/18: 0.02 ng/mL 01/30/19: 0.01 ng/mL 07/02/20: <0.01 ng/mL 08/10/20: <0.01 ng/mL 07/08/21: <0.01 ng/mL 06/15/22: <0.01 ng/mL 05/23/23: <0.01 ng/mL PSA stable    Enlarged prostate:  C/O slow flow and difficulty in urination PVR: 57 cc  Denies hematuria, dysuria, and urgency  Recommended getting cystoscopy to r/o stricture.  The procedure, alternatives, benefits and risks were explained to the pt.  He understands and requests to proceed with the cystoscopy.  On Tamsulosin. Renewed today and will continue    Right Renal Mass:  Last renal sonogram done on: 8/17/22: 1.2 cm cystic lesion of upper right kidney. No changes since 2016. On sonogram: cystic. Renal cyst. Unlikely to be malignant.  renal sonogram today:  Findings: Bilateral multiple simple cysts visualized with no flow. Both kidneys are normal in size without stones or hydronephrosis  ED: Sildenafil 100 mg PO PRN daily. Working well.  Renewed today and will continue   RTC: 2 weeks for Cystoscopy with possible biopsy and fulguration to r/o stricture         08/31/2023: Mr. HUNT is a 68 year male who presents today for a follow up for frequent urination ,slow flow and difficulty in urination   cystoscopy today to r/o stricture.  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 Could not evaluate prostate or bladder. Will schedule for cysto, visual internal urethrotomy, poss TURP at [facility].   Ca Prostate: Developed cancer at the age of 52. Diagnosed in 2007. Biopsy Everett Score 6, PSA 3.4 ng/ml Treated with I 125 Seed implant 01/08/2008.   PSA: 03/08/18: 0.02 ng/mL 01/30/19: 0.01 ng/mL 07/02/20: <0.01 ng/mL 08/10/20: <0.01 ng/mL 07/08/21: <0.01 ng/mL 06/15/22: <0.01 ng/mL 05/23/23: <0.01 ng/mL PSA stable    Right renal mass: renal sonogram 08/16/2023 Findings: Bilateral multiple simple cysts visualized with no flow.  Both kidneys are normal in size without stones or hydronephrosis No changes since 2016.   ED: Sildenafil 100 mg PO PRN daily. Working well.  Renewed today and will continue    Scheduled for VIU and p[oss TURP at [facility].        11/16/2023: Mr. HUNT is a 68 year male who presents today for a follow up for Urethral Stricture, s/p VIU with Laser 11/10/2023    POSTOPERATIVE DIAGNOSES: Urethral stricture, history of CA prostate: 11/10/23 NATURE OF OPERATION: Cystopanendoscopy, laser direct visual internal urethrotomy  in cystoscopy and insertion of Schaefer catheter. Here today to remove 22 F catheter   H/O Ca prostate, frequent urination and erectile disfunction   Ca Prostate: Developed cancer at the age of 52. Diagnosed in 2007. Biopsy Caridad Score 6, PSA 3.4 ng/ml Treated with I 125 Seed implant 01/08/2008.  PSA: 03/08/18: 0.02 ng/mL 01/30/19: 0.01 ng/mL 07/02/20: <0.01 ng/mL 08/10/20: <0.01 ng/mL 07/08/21: <0.01 ng/mL 06/15/22: <0.01 ng/mL 05/23/23: <0.01 ng/mL PSA stable   Frequent urination : On Tamsulosin  Erectile disfunction : On Sildenafil  100 mg PO PRN daily   Removed Schaefer catheter today. PVR: 2 cc   RTC: 1 month for Follow up : UA, Culture, Uro -flow, and PVR         12/27/2023: Mr. HUNT is a 68 year male who presents today for a follow up for frequent urination and ED  S/P Laser visual urethrotomy stricture membranous urethra on 11/13/2023. Removed Schaefer catheter on 11/16/23. Here today to f/u after one month after catheter removed   Patient is voiding and emptying bladder well. On Tamsulosin but doing much better. Flow is good. PVR today was 11 cc, and in august it was 57cc. Will DC Tamsulosin. Urine was collected to r/o infection   Erectile disfunction : On Sildenafil 100 mg PO PRN daily   H/O Ca prostate Ca Prostate: Developed cancer at the age of 52. Diagnosed in 2007. Biopsy Everett Score 6, PSA 3.4 ng/ml Treated with I 125 Seed implant 01/08/2008. PSA: 03/08/18: 0.02 ng/mL 01/30/19: 0.01 ng/mL 07/02/20: <0.01 ng/mL 08/10/20: <0.01 ng/mL 07/08/21: <0.01 ng/mL 06/15/22: <0.01 ng/mL 05/23/23: <0.01 ng/mL PSA stable  RTC: 3 months for Uroflow and Bladder Scan.

## 2023-12-27 NOTE — PHYSICAL EXAM
[General Appearance - Well Developed] : well developed [General Appearance - Well Nourished] : well nourished [Urethral Meatus] : meatus normal [Penis Abnormality] : normal uncircumcised penis [Scrotum] : the scrotum was normal [Epididymis] : the epididymides were normal [Testes Tenderness] : no tenderness of the testes [Testes Mass (___cm)] : there were no testicular masses [Not Anxious] : not anxious [Normal Appearance] : normal appearance [Well Groomed] : well groomed [General Appearance - In No Acute Distress] : no acute distress [Edema] : no peripheral edema [Respiration, Rhythm And Depth] : normal respiratory rhythm and effort [Exaggerated Use Of Accessory Muscles For Inspiration] : no accessory muscle use [Abdomen Soft] : soft [Abdomen Tenderness] : non-tender [Costovertebral Angle Tenderness] : no ~M costovertebral angle tenderness [Urinary Bladder Findings] : the bladder was normal on palpation [Normal Station and Gait] : the gait and station were normal for the patient's age [] : no rash [No Focal Deficits] : no focal deficits [Oriented To Time, Place, And Person] : oriented to person, place, and time [Affect] : the affect was normal [Mood] : the mood was normal [No Palpable Adenopathy] : no palpable adenopathy [FreeTextEntry1] : Schaefer catheter in place.

## 2023-12-31 PROBLEM — Z23 NEED FOR 23-POLYVALENT PNEUMOCOCCAL POLYSACCHARIDE VACCINE: Status: RESOLVED | Noted: 2021-11-12 | Resolved: 2022-06-15

## 2024-05-01 ENCOUNTER — APPOINTMENT (OUTPATIENT)
Dept: UROLOGY | Facility: CLINIC | Age: 69
End: 2024-05-01
Payer: MEDICARE

## 2024-05-01 VITALS
BODY MASS INDEX: 26.4 KG/M2 | HEART RATE: 79 BPM | OXYGEN SATURATION: 97 % | SYSTOLIC BLOOD PRESSURE: 124 MMHG | DIASTOLIC BLOOD PRESSURE: 78 MMHG | HEIGHT: 63 IN | TEMPERATURE: 97.2 F | WEIGHT: 149 LBS

## 2024-05-01 VITALS
HEIGHT: 63 IN | SYSTOLIC BLOOD PRESSURE: 124 MMHG | DIASTOLIC BLOOD PRESSURE: 78 MMHG | BODY MASS INDEX: 26.4 KG/M2 | WEIGHT: 149 LBS

## 2024-05-01 DIAGNOSIS — R35.0 FREQUENCY OF MICTURITION: ICD-10-CM

## 2024-05-01 DIAGNOSIS — N35.912 UNSPECIFIED BULBOUS URETHRAL STRICTURE, MALE: ICD-10-CM

## 2024-05-01 DIAGNOSIS — N52.9 MALE ERECTILE DYSFUNCTION, UNSPECIFIED: ICD-10-CM

## 2024-05-01 DIAGNOSIS — N40.0 BENIGN PROSTATIC HYPERPLASIA WITHOUT LOWER URINARY TRACT SYMPMS: ICD-10-CM

## 2024-05-01 DIAGNOSIS — C61 MALIGNANT NEOPLASM OF PROSTATE: ICD-10-CM

## 2024-05-01 PROCEDURE — 51798 US URINE CAPACITY MEASURE: CPT

## 2024-05-01 PROCEDURE — 99213 OFFICE O/P EST LOW 20 MIN: CPT

## 2024-05-01 RX ORDER — SILDENAFIL 100 MG/1
100 TABLET, FILM COATED ORAL
Qty: 30 | Refills: 2 | Status: ACTIVE | COMMUNITY
Start: 2022-07-13 | End: 1900-01-01

## 2024-05-01 NOTE — END OF VISIT
[FreeTextEntry4] :  This note was written by Sydnee Street on 05/01/2024 actively solely Khoi Mathis M.D. I, Sydnee Street, am scribing for and in the presence of Khoi Mathis M.D. in the following sections HISTORY OF PRESENT ILLNESS, PAST MEDICAL/FAMILY/SOCIAL HISTORY; REVIEW OF SYSTEMS; VITAL SIGNS; PHYSICAL EXAM; ASSESSMENT/PLAN.   All medical record entries made by this scribe at my, Khoi Mathis M.D. direction and personally dictated by me on 05/01/2024. I personally performed the services described in the documentation, reviewed the documentation recorded by the scribe in my presence, and it accurately and completely records my words and actions. [Time Spent: ___ minutes] : I have spent [unfilled] minutes of time on the encounter.

## 2024-05-01 NOTE — HISTORY OF PRESENT ILLNESS
[FreeTextEntry1] : 07/13/2022: Mr. HUNT is a 67 year male is here for Ca Prostate and ED.  Ca Prostate: diagnosed in 2007. Biopsy Caridad Score 6, PSA 3.4 ng/ml, treated with I 125 Seed implant. Has increased frequency in the night X 4, most likely secondary to radiation. On Tamsulosin. No significant residual urine. PSA: 6/15/22: 0.01 ng/mL  Mr. Hunt is having back pain. 2011 he had 2 cysts in the kidney. and an inderminate 1.5 cm right renal mass, no change as of last CT 2021. Repeat CT scan requested.  Mr. Hunt is stating his urinary stream and flow is slow. Patient is voiding and emptying bladder completely. PVR: 0. N x4- 5. Day time : q 4 h. On Tamsulosin.  ED: has poor erections and was given Sildenafil and is not helping. He has soft erections, but will not last. Mr. Hunt was educated how to use the medication properly, and medicine renewed.  RTC: one month to check CT scan.   08/17/2022: Mr. HUNT is a 67 year male who presents today for a follow up for Ca Prostate, Right Renal Mass, Nocturia, and ED  Ca Prostate: Diagnosed in 2007. Biopsy Mission Score 6, PSA 3.4 ng/ml, treated with I 125 Seed implant. No lymph nodes found in CT scan done on 07/20/22. Stable.  Right Renal Mass: 1.2 cm septated cystic lesion of upper right kidney. No changes since 2016. On sonogram: cystic. Renal cyst. Unlikely to be malignant.  Nocturia: Most likely secondary to radiation. Pt is urinating q 2 hrs at night time and q 2-3 hrs during the day. His stream is slow. Taking Tamsulosin.  ED: Mr. Hunt has poor erections and is taking Sildenafil 100 mg. It is working for him and he will take it PRN  RTC: 1 year Follow up : UA, Culture, Uro -flow, and PVR     08/16/2023: Mr. HUNT is a 68 year male who presents today for a follow up for Ca Prostate, Right Renal Mass, and ED Complaining of slow urine and increased frequency of urination, already on Tamsulosin. Uroflow is slow, PVR 57 ml. S/P brachytherapy 2011. Will schedule for cystoscopy to r/o urethral stricture and evaluate prostatic fossa.  Ca Prostate: Developed cancer at the age of 52. Diagnosed in 2007. Biopsy Caridad Score 6, PSA 3.4 ng/ml Treated with I 125 Seed implant.  PSA: 03/08/18: 0.02 ng/mL 01/30/19: 0.01 ng/mL 07/02/20: <0.01 ng/mL 08/10/20: <0.01 ng/mL 07/08/21: <0.01 ng/mL 06/15/22: <0.01 ng/mL 05/23/23: <0.01 ng/mL PSA stable   Enlarged prostate: C/O slow flow and difficulty in urination PVR: 57 cc Denies hematuria, dysuria, and urgency Recommended getting cystoscopy to r/o stricture. The procedure, alternatives, benefits and risks were explained to the pt. He understands and requests to proceed with the cystoscopy. On Tamsulosin. Renewed today and will continue   Right Renal Mass: Last renal sonogram done on: 8/17/22: 1.2 cm cystic lesion of upper right kidney. No changes since 2016. On sonogram: cystic. Renal cyst. Unlikely to be malignant.  renal sonogram today: Findings: Bilateral multiple simple cysts visualized with no flow. Both kidneys are normal in size without stones or hydronephrosis  ED: Sildenafil 100 mg PO PRN daily. Working well. Renewed today and will continue  RTC: 2 weeks for Cystoscopy with possible biopsy and fulguration to r/o stricture        08/31/2023: Mr. HUNT is a 68 year male who presents today for a follow up for frequent urination ,slow flow and difficulty in urination  cystoscopy today to r/o stricture. 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 Could not evaluate prostate or bladder. Will schedule for cysto, visual internal urethrotomy, poss [facility] at [facility].  Ca Prostate: Developed cancer at the age of 52. Diagnosed in 2007. Biopsy Mission Score 6, PSA 3.4 ng/ml Treated with I 125 Seed implant 01/08/2008.  PSA: 03/08/18: 0.02 ng/mL 01/30/19: 0.01 ng/mL 07/02/20: <0.01 ng/mL 08/10/20: <0.01 ng/mL 07/08/21: <0.01 ng/mL 06/15/22: <0.01 ng/mL 05/23/23: <0.01 ng/mL PSA stable   Right renal mass: renal sonogram 08/16/2023 Findings: Bilateral multiple simple cysts visualized with no flow. Both kidneys are normal in size without stones or hydronephrosis No changes since 2016.   ED: Sildenafil 100 mg PO PRN daily. Working well. Renewed today and will continue   Scheduled for VIU and p[oss TURP at [facility].       11/16/2023: Mr. HUNT is a 68 year male who presents today for a follow up for Urethral Stricture, s/p VIU with Laser 11/10/2023    POSTOPERATIVE DIAGNOSES: Urethral stricture, history of CA prostate: 11/10/23 NATURE OF OPERATION: Cystopanendoscopy, laser direct visual internal urethrotomy in cystoscopy and insertion of Schaefer catheter. Here today to remove 22 F catheter  H/O Ca prostate, frequent urination and erectile disfunction  Ca Prostate: Developed cancer at the age of 52. Diagnosed in 2007. Biopsy Mission Score 6, PSA 3.4 ng/ml Treated with I 125 Seed implant 01/08/2008.  PSA: 03/08/18: 0.02 ng/mL 01/30/19: 0.01 ng/mL 07/02/20: <0.01 ng/mL 08/10/20: <0.01 ng/mL 07/08/21: <0.01 ng/mL 06/15/22: <0.01 ng/mL 05/23/23: <0.01 ng/mL PSA stable  Frequent urination : On Tamsulosin Erectile disfunction : On Sildenafil 100 mg PO PRN daily  Removed Schaefer catheter today. PVR: 2 cc  RTC: 1 month for Follow up : UA, Culture, Uro -flow, and PVR        12/27/2023: Mr. HUNT is a 68 year male who presents today for a follow up for frequent urination and ED  S/P Laser visual urethrotomy stricture membranous urethra on 11/13/2023. Removed Schaefer catheter on 11/16/23. Here today to f/u after one month after catheter removed  Patient is voiding and emptying bladder well. On Tamsulosin but doing much better. Flow is good. PVR today was 11 cc, and in august it was 57cc. Will DC Tamsulosin. Urine was collected to r/o infection  Erectile disfunction : On Sildenafil 100 mg PO PRN daily   H/O Ca prostate Ca Prostate: Developed cancer at the age of 52. Diagnosed in 2007. Biopsy Mission Score 6, PSA 3.4 ng/ml Treated with I 125 Seed implant 01/08/2008. PSA: 03/08/18: 0.02 ng/mL 01/30/19: 0.01 ng/mL 07/02/20: <0.01 ng/mL 08/10/20: <0.01 ng/mL 07/08/21: <0.01 ng/mL 06/15/22: <0.01 ng/mL 05/23/23: <0.01 ng/mL PSA stable  RTC: 3 months for Uroflow and Bladder Scan.  05/01/2024: Mr. HUNT is a 69 year male who presents today for a follow up for frequent urination, h/o CA prostate, and ED. S/P Laser visual urethrotomy stricture membranous urethra on 11/13/2023. Removed Schaefer catheter on 11/16/23.  Frequent Urination: Pt voiding and emptying bladder well. Flow is good. Pt urinates 2-3x at night and every 2-3 hours during the day. He denies dysuria. PVR today was 0 cc and in 12/2023 was 11 cc, and in august 2023 it was 57 cc. Voiding well. PVR: 0 ml.   Erectile Dysfunction: Doing well on 100 MG Sildenafil Citrate. Wiil continue.  H/O Ca prostate Ca Prostate: Developed cancer at the age of 52. Diagnosed in 2007. Biopsy Caridad Score 3+3, PSA 3.4 ng/ml Treated with I 125 Seed implant 01/08/2008. PSA: 03/08/18: 0.02 ng/mL 01/30/19: 0.01 ng/mL 07/02/20: <0.01 ng/mL 08/10/20: <0.01 ng/mL 07/08/21: <0.01 ng/mL 06/15/22: <0.01 ng/mL 05/23/23: <0.01 ng/mL PSA stable  PSA drawn today.   Urinalysis and culture done today. PSA drawn today.  Rx refill for Sildenafil given today. RTO in 6 months for Uroflow and Bladder Scan, and check PSA

## 2024-05-01 NOTE — LETTER BODY
[Dear  ___] : Dear  [unfilled], [Consult Letter:] : I had the pleasure of evaluating your patient, [unfilled]. [Please see my note below.] : Please see my note below. [Consult Closing:] : Thank you very much for allowing me to participate in the care of this patient.  If you have any questions, please do not hesitate to contact me. [Sincerely,] : Sincerely, [FreeTextEntry3] : Khoi Goncalves MD

## 2024-05-07 LAB
APPEARANCE: CLEAR
BACTERIA UR CULT: NORMAL
BACTERIA: NEGATIVE /HPF
BILIRUBIN URINE: NEGATIVE
BLOOD URINE: NEGATIVE
CAST: 0 /LPF
COLOR: YELLOW
EPITHELIAL CELLS: 0 /HPF
GLUCOSE QUALITATIVE U: NEGATIVE MG/DL
KETONES URINE: NEGATIVE MG/DL
LEUKOCYTE ESTERASE URINE: NEGATIVE
MICROSCOPIC-UA: NORMAL
NITRITE URINE: NEGATIVE
PH URINE: 6
PROTEIN URINE: NEGATIVE MG/DL
PSA SERPL-MCNC: <0.01 NG/ML
RED BLOOD CELLS URINE: 0 /HPF
SPECIFIC GRAVITY URINE: 1.02
UROBILINOGEN URINE: 0.2 MG/DL
WHITE BLOOD CELLS URINE: 0 /HPF

## 2024-06-04 ENCOUNTER — APPOINTMENT (OUTPATIENT)
Dept: FAMILY MEDICINE | Facility: CLINIC | Age: 69
End: 2024-06-04
Payer: MEDICARE

## 2024-06-04 VITALS
HEART RATE: 66 BPM | HEIGHT: 63 IN | BODY MASS INDEX: 26.58 KG/M2 | WEIGHT: 150 LBS | DIASTOLIC BLOOD PRESSURE: 86 MMHG | TEMPERATURE: 97.3 F | SYSTOLIC BLOOD PRESSURE: 124 MMHG | RESPIRATION RATE: 16 BRPM | OXYGEN SATURATION: 99 %

## 2024-06-04 DIAGNOSIS — R10.13 EPIGASTRIC PAIN: ICD-10-CM

## 2024-06-04 DIAGNOSIS — E78.5 HYPERLIPIDEMIA, UNSPECIFIED: ICD-10-CM

## 2024-06-04 DIAGNOSIS — Z00.00 ENCOUNTER FOR GENERAL ADULT MEDICAL EXAMINATION W/OUT ABNORMAL FINDINGS: ICD-10-CM

## 2024-06-04 DIAGNOSIS — L30.9 DERMATITIS, UNSPECIFIED: ICD-10-CM

## 2024-06-04 PROCEDURE — G0439: CPT

## 2024-06-04 PROCEDURE — 36415 COLL VENOUS BLD VENIPUNCTURE: CPT

## 2024-06-04 RX ORDER — SIMVASTATIN 40 MG/1
40 TABLET, FILM COATED ORAL
Qty: 90 | Refills: 1 | Status: ACTIVE | COMMUNITY
Start: 2018-04-27 | End: 1900-01-01

## 2024-06-04 RX ORDER — OMEPRAZOLE 40 MG/1
40 CAPSULE, DELAYED RELEASE ORAL
Qty: 1 | Refills: 1 | Status: ACTIVE | COMMUNITY
Start: 2024-06-04 | End: 1900-01-01

## 2024-06-05 LAB
25(OH)D3 SERPL-MCNC: 23.7 NG/ML
ALBUMIN SERPL ELPH-MCNC: 4.4 G/DL
ALP BLD-CCNC: 87 U/L
ALT SERPL-CCNC: 21 U/L
ANION GAP SERPL CALC-SCNC: 12 MMOL/L
APPEARANCE: CLEAR
AST SERPL-CCNC: 15 U/L
BACTERIA: NEGATIVE /HPF
BASOPHILS # BLD AUTO: 0.04 K/UL
BASOPHILS NFR BLD AUTO: 0.9 %
BILIRUB SERPL-MCNC: 0.4 MG/DL
BILIRUBIN URINE: NEGATIVE
BLOOD URINE: NEGATIVE
BUN SERPL-MCNC: 16 MG/DL
CALCIUM SERPL-MCNC: 9.7 MG/DL
CAST: 0 /LPF
CHLORIDE SERPL-SCNC: 105 MMOL/L
CHOLEST SERPL-MCNC: 215 MG/DL
CO2 SERPL-SCNC: 21 MMOL/L
COLOR: YELLOW
CREAT SERPL-MCNC: 1.05 MG/DL
EGFR: 77 ML/MIN/1.73M2
EOSINOPHIL # BLD AUTO: 0.32 K/UL
EOSINOPHIL NFR BLD AUTO: 6.9 %
EPITHELIAL CELLS: 0 /HPF
ESTIMATED AVERAGE GLUCOSE: 131 MG/DL
GLUCOSE QUALITATIVE U: NEGATIVE MG/DL
GLUCOSE SERPL-MCNC: 111 MG/DL
HBA1C MFR BLD HPLC: 6.2 %
HCT VFR BLD CALC: 41.2 %
HDLC SERPL-MCNC: 50 MG/DL
HGB BLD-MCNC: 14.1 G/DL
IMM GRANULOCYTES NFR BLD AUTO: 0.2 %
KETONES URINE: NEGATIVE MG/DL
LDLC SERPL CALC-MCNC: 136 MG/DL
LEUKOCYTE ESTERASE URINE: NEGATIVE
LYMPHOCYTES # BLD AUTO: 1.8 K/UL
LYMPHOCYTES NFR BLD AUTO: 38.6 %
MAN DIFF?: NORMAL
MCHC RBC-ENTMCNC: 29.1 PG
MCHC RBC-ENTMCNC: 34.2 GM/DL
MCV RBC AUTO: 84.9 FL
MICROSCOPIC-UA: NORMAL
MONOCYTES # BLD AUTO: 0.3 K/UL
MONOCYTES NFR BLD AUTO: 6.4 %
NEUTROPHILS # BLD AUTO: 2.19 K/UL
NEUTROPHILS NFR BLD AUTO: 47 %
NITRITE URINE: NEGATIVE
NONHDLC SERPL-MCNC: 165 MG/DL
PH URINE: 6
PLATELET # BLD AUTO: 187 K/UL
POTASSIUM SERPL-SCNC: 4.4 MMOL/L
PROT SERPL-MCNC: 6.7 G/DL
PROTEIN URINE: NEGATIVE MG/DL
RBC # BLD: 4.85 M/UL
RBC # FLD: 13.3 %
RED BLOOD CELLS URINE: 0 /HPF
SODIUM SERPL-SCNC: 138 MMOL/L
SPECIFIC GRAVITY URINE: 1.02
TRIGL SERPL-MCNC: 163 MG/DL
TSH SERPL-ACNC: 3.55 UIU/ML
UROBILINOGEN URINE: 0.2 MG/DL
WBC # FLD AUTO: 4.66 K/UL
WHITE BLOOD CELLS URINE: 0 /HPF

## 2024-09-04 NOTE — H&P PST ADULT - NSALCOHOLFREQ_GEN_A_CORE_SD
9/4/2024        RE: Kathryn Montano         [id number] [place] [id number]          To Whom It May Concern,      Due to medical reasons, Kathryn Montano had a surgical procedure today, 9/4/2024. Patient is under the influence of anesthesia for 24 hours.         Sincerely,          DENIS Shine     2-4 times/mo

## 2024-11-06 ENCOUNTER — APPOINTMENT (OUTPATIENT)
Dept: UROLOGY | Facility: CLINIC | Age: 69
End: 2024-11-06
Payer: MEDICARE

## 2024-11-06 ENCOUNTER — NON-APPOINTMENT (OUTPATIENT)
Age: 69
End: 2024-11-06

## 2024-11-06 VITALS
HEART RATE: 66 BPM | TEMPERATURE: 97.8 F | WEIGHT: 157 LBS | BODY MASS INDEX: 28.89 KG/M2 | HEIGHT: 62 IN | SYSTOLIC BLOOD PRESSURE: 142 MMHG | DIASTOLIC BLOOD PRESSURE: 90 MMHG | RESPIRATION RATE: 16 BRPM | OXYGEN SATURATION: 96 %

## 2024-11-06 DIAGNOSIS — R35.0 FREQUENCY OF MICTURITION: ICD-10-CM

## 2024-11-06 DIAGNOSIS — N28.1 CYST OF KIDNEY, ACQUIRED: ICD-10-CM

## 2024-11-06 DIAGNOSIS — N40.0 BENIGN PROSTATIC HYPERPLASIA WITHOUT LOWER URINARY TRACT SYMPMS: ICD-10-CM

## 2024-11-06 DIAGNOSIS — N35.912 UNSPECIFIED BULBOUS URETHRAL STRICTURE, MALE: ICD-10-CM

## 2024-11-06 DIAGNOSIS — C61 MALIGNANT NEOPLASM OF PROSTATE: ICD-10-CM

## 2024-11-06 DIAGNOSIS — N28.89 OTHER SPECIFIED DISORDERS OF KIDNEY AND URETER: ICD-10-CM

## 2024-11-06 DIAGNOSIS — N52.9 MALE ERECTILE DYSFUNCTION, UNSPECIFIED: ICD-10-CM

## 2024-11-06 PROCEDURE — 51741 ELECTRO-UROFLOWMETRY FIRST: CPT

## 2024-11-06 PROCEDURE — 99214 OFFICE O/P EST MOD 30 MIN: CPT

## 2024-11-06 PROCEDURE — 51798 US URINE CAPACITY MEASURE: CPT

## 2024-11-07 LAB
APPEARANCE: CLEAR
BACTERIA UR CULT: NORMAL
BACTERIA: NEGATIVE /HPF
BILIRUBIN URINE: NEGATIVE
BLOOD URINE: NEGATIVE
CAST: 0 /LPF
COLOR: YELLOW
EPITHELIAL CELLS: 0 /HPF
GLUCOSE QUALITATIVE U: NEGATIVE MG/DL
KETONES URINE: NEGATIVE MG/DL
LEUKOCYTE ESTERASE URINE: NEGATIVE
MICROSCOPIC-UA: NORMAL
NITRITE URINE: NEGATIVE
PH URINE: 6
PROTEIN URINE: NEGATIVE MG/DL
PSA SERPL-MCNC: <0.01 NG/ML
RED BLOOD CELLS URINE: 0 /HPF
SPECIFIC GRAVITY URINE: 1.01
UROBILINOGEN URINE: 0.2 MG/DL
WHITE BLOOD CELLS URINE: 0 /HPF

## 2024-12-05 ENCOUNTER — RX RENEWAL (OUTPATIENT)
Age: 69
End: 2024-12-05

## 2025-01-24 ENCOUNTER — RX RENEWAL (OUTPATIENT)
Age: 70
End: 2025-01-24

## 2025-02-05 ENCOUNTER — APPOINTMENT (OUTPATIENT)
Dept: FAMILY MEDICINE | Facility: CLINIC | Age: 70
End: 2025-02-05
Payer: MEDICARE

## 2025-02-05 VITALS
OXYGEN SATURATION: 97 % | RESPIRATION RATE: 16 BRPM | TEMPERATURE: 98.1 F | BODY MASS INDEX: 28.34 KG/M2 | HEIGHT: 62 IN | DIASTOLIC BLOOD PRESSURE: 86 MMHG | WEIGHT: 154 LBS | SYSTOLIC BLOOD PRESSURE: 128 MMHG | HEART RATE: 78 BPM

## 2025-02-05 DIAGNOSIS — R79.89 OTHER SPECIFIED ABNORMAL FINDINGS OF BLOOD CHEMISTRY: ICD-10-CM

## 2025-02-05 DIAGNOSIS — R73.03 PREDIABETES.: ICD-10-CM

## 2025-02-05 DIAGNOSIS — E78.5 HYPERLIPIDEMIA, UNSPECIFIED: ICD-10-CM

## 2025-02-05 PROCEDURE — 36415 COLL VENOUS BLD VENIPUNCTURE: CPT

## 2025-02-05 PROCEDURE — 99214 OFFICE O/P EST MOD 30 MIN: CPT | Mod: 25

## 2025-02-06 LAB
25(OH)D3 SERPL-MCNC: 37.7 NG/ML
ALBUMIN SERPL ELPH-MCNC: 4.3 G/DL
ALP BLD-CCNC: 107 U/L
ALT SERPL-CCNC: 29 U/L
ANION GAP SERPL CALC-SCNC: 16 MMOL/L
AST SERPL-CCNC: 19 U/L
BASOPHILS # BLD AUTO: 0.04 K/UL
BASOPHILS NFR BLD AUTO: 0.7 %
BILIRUB SERPL-MCNC: 0.4 MG/DL
BUN SERPL-MCNC: 18 MG/DL
CALCIUM SERPL-MCNC: 10.2 MG/DL
CHLORIDE SERPL-SCNC: 106 MMOL/L
CHOLEST SERPL-MCNC: 323 MG/DL
CO2 SERPL-SCNC: 22 MMOL/L
CREAT SERPL-MCNC: 1 MG/DL
EGFR: 81 ML/MIN/1.73M2
EOSINOPHIL # BLD AUTO: 0.36 K/UL
EOSINOPHIL NFR BLD AUTO: 6.6 %
ESTIMATED AVERAGE GLUCOSE: 137 MG/DL
GLUCOSE SERPL-MCNC: 90 MG/DL
HBA1C MFR BLD HPLC: 6.4 %
HCT VFR BLD CALC: 46 %
HDLC SERPL-MCNC: 45 MG/DL
HGB BLD-MCNC: 15.4 G/DL
IMM GRANULOCYTES NFR BLD AUTO: 0.4 %
LDLC SERPL CALC-MCNC: 235 MG/DL
LYMPHOCYTES # BLD AUTO: 2.01 K/UL
LYMPHOCYTES NFR BLD AUTO: 36.9 %
MAN DIFF?: NORMAL
MCHC RBC-ENTMCNC: 29.3 PG
MCHC RBC-ENTMCNC: 33.5 G/DL
MCV RBC AUTO: 87.5 FL
MONOCYTES # BLD AUTO: 0.29 K/UL
MONOCYTES NFR BLD AUTO: 5.3 %
NEUTROPHILS # BLD AUTO: 2.73 K/UL
NEUTROPHILS NFR BLD AUTO: 50.1 %
NONHDLC SERPL-MCNC: 278 MG/DL
PLATELET # BLD AUTO: 211 K/UL
POTASSIUM SERPL-SCNC: 4.6 MMOL/L
PROT SERPL-MCNC: 7.4 G/DL
RBC # BLD: 5.26 M/UL
RBC # FLD: 13.2 %
SODIUM SERPL-SCNC: 144 MMOL/L
TRIGL SERPL-MCNC: 216 MG/DL
TSH SERPL-ACNC: 2.53 UIU/ML
WBC # FLD AUTO: 5.45 K/UL

## 2025-05-14 DIAGNOSIS — N40.0 BENIGN PROSTATIC HYPERPLASIA WITHOUT LOWER URINARY TRACT SYMPMS: ICD-10-CM

## 2025-05-15 ENCOUNTER — APPOINTMENT (OUTPATIENT)
Dept: UROLOGY | Facility: CLINIC | Age: 70
End: 2025-05-15
Payer: MEDICARE

## 2025-05-15 VITALS
DIASTOLIC BLOOD PRESSURE: 89 MMHG | HEIGHT: 64 IN | HEART RATE: 80 BPM | SYSTOLIC BLOOD PRESSURE: 154 MMHG | TEMPERATURE: 97.7 F | OXYGEN SATURATION: 98 % | BODY MASS INDEX: 26.63 KG/M2 | WEIGHT: 156 LBS

## 2025-05-15 DIAGNOSIS — N30.40 IRRADIATION CYSTITIS W/OUT HEMATURIA: ICD-10-CM

## 2025-05-15 DIAGNOSIS — R35.0 FREQUENCY OF MICTURITION: ICD-10-CM

## 2025-05-15 DIAGNOSIS — N52.9 MALE ERECTILE DYSFUNCTION, UNSPECIFIED: ICD-10-CM

## 2025-05-15 DIAGNOSIS — N35.912 UNSPECIFIED BULBOUS URETHRAL STRICTURE, MALE: ICD-10-CM

## 2025-05-15 DIAGNOSIS — C61 MALIGNANT NEOPLASM OF PROSTATE: ICD-10-CM

## 2025-05-15 PROCEDURE — 51798 US URINE CAPACITY MEASURE: CPT

## 2025-05-15 PROCEDURE — 99214 OFFICE O/P EST MOD 30 MIN: CPT

## 2025-05-15 PROCEDURE — 51741 ELECTRO-UROFLOWMETRY FIRST: CPT

## 2025-06-17 ENCOUNTER — APPOINTMENT (OUTPATIENT)
Dept: FAMILY MEDICINE | Facility: CLINIC | Age: 70
End: 2025-06-17
Payer: MEDICARE

## 2025-06-17 VITALS
SYSTOLIC BLOOD PRESSURE: 136 MMHG | HEIGHT: 64 IN | HEART RATE: 61 BPM | WEIGHT: 155.25 LBS | BODY MASS INDEX: 26.5 KG/M2 | RESPIRATION RATE: 16 BRPM | DIASTOLIC BLOOD PRESSURE: 90 MMHG | OXYGEN SATURATION: 96 %

## 2025-06-17 PROCEDURE — 36415 COLL VENOUS BLD VENIPUNCTURE: CPT

## 2025-06-17 PROCEDURE — G0439: CPT

## 2025-06-18 LAB
25(OH)D3 SERPL-MCNC: 25.8 NG/ML
ALBUMIN SERPL ELPH-MCNC: 4 G/DL
ALP BLD-CCNC: 101 U/L
ALT SERPL-CCNC: 23 U/L
ANION GAP SERPL CALC-SCNC: 13 MMOL/L
AST SERPL-CCNC: 15 U/L
BASOPHILS # BLD AUTO: 0.07 K/UL
BASOPHILS NFR BLD AUTO: 1.1 %
BILIRUB SERPL-MCNC: 0.4 MG/DL
BUN SERPL-MCNC: 18 MG/DL
CALCIUM SERPL-MCNC: 9.2 MG/DL
CHLORIDE SERPL-SCNC: 105 MMOL/L
CHOLEST SERPL-MCNC: 156 MG/DL
CO2 SERPL-SCNC: 20 MMOL/L
CREAT SERPL-MCNC: 1.08 MG/DL
EGFRCR SERPLBLD CKD-EPI 2021: 74 ML/MIN/1.73M2
EOSINOPHIL # BLD AUTO: 1.02 K/UL
EOSINOPHIL NFR BLD AUTO: 16 %
ESTIMATED AVERAGE GLUCOSE: 131 MG/DL
GLUCOSE SERPL-MCNC: 98 MG/DL
HBA1C MFR BLD HPLC: 6.2 %
HCT VFR BLD CALC: 43.6 %
HDLC SERPL-MCNC: 41 MG/DL
HGB BLD-MCNC: 14.1 G/DL
IMM GRANULOCYTES NFR BLD AUTO: 0.3 %
LDLC SERPL-MCNC: 93 MG/DL
LYMPHOCYTES # BLD AUTO: 2.2 K/UL
LYMPHOCYTES NFR BLD AUTO: 34.5 %
MAN DIFF?: NORMAL
MCHC RBC-ENTMCNC: 28.6 PG
MCHC RBC-ENTMCNC: 32.3 G/DL
MCV RBC AUTO: 88.4 FL
MONOCYTES # BLD AUTO: 0.33 K/UL
MONOCYTES NFR BLD AUTO: 5.2 %
NEUTROPHILS # BLD AUTO: 2.74 K/UL
NEUTROPHILS NFR BLD AUTO: 42.9 %
NONHDLC SERPL-MCNC: 114 MG/DL
PLATELET # BLD AUTO: 191 K/UL
POTASSIUM SERPL-SCNC: 4.4 MMOL/L
PROT SERPL-MCNC: 6.6 G/DL
RBC # BLD: 4.93 M/UL
RBC # FLD: 13.1 %
SODIUM SERPL-SCNC: 137 MMOL/L
TRIGL SERPL-MCNC: 120 MG/DL
TSH SERPL-ACNC: 2.35 UIU/ML
WBC # FLD AUTO: 6.38 K/UL

## (undated) DEVICE — Device

## (undated) DEVICE — SOL IRR GLYCINE 1.5% 3000L

## (undated) DEVICE — FOLEY CATH 2-WAY 20FR 5CC LATEX COUNCIL RED

## (undated) DEVICE — FOLEY HOLDER STATLOCK 2 WAY ADULT

## (undated) DEVICE — SYR ASEPTO

## (undated) DEVICE — VENODYNE/SCD SLEEVE CALF MEDIUM

## (undated) DEVICE — SYR CONTROL LUER LOK 10CC

## (undated) DEVICE — SYR LUER LOK 10CC

## (undated) DEVICE — SYR LUER LOK 20CC

## (undated) DEVICE — TUBING LEVEL ONE NORMOFLO SET

## (undated) DEVICE — DRAINAGE BAG URINARY 4L

## (undated) DEVICE — ELCTR CUT MONO 24-26FR 12/30 DEG 4X35MM

## (undated) DEVICE — GLV 7 PROTEXIS (WHITE)

## (undated) DEVICE — DRAPE TOWEL BLUE 17" X 24"